# Patient Record
Sex: MALE | Race: WHITE | NOT HISPANIC OR LATINO | Employment: FULL TIME | RURAL
[De-identification: names, ages, dates, MRNs, and addresses within clinical notes are randomized per-mention and may not be internally consistent; named-entity substitution may affect disease eponyms.]

---

## 2017-01-06 RX ORDER — OMEPRAZOLE 40 MG/1
CAPSULE, DELAYED RELEASE ORAL
Qty: 90 CAPSULE | Refills: 1 | Status: SHIPPED | OUTPATIENT
Start: 2017-01-06 | End: 2017-07-03 | Stop reason: SDUPTHER

## 2017-01-30 ENCOUNTER — OFFICE VISIT (OUTPATIENT)
Dept: FAMILY MEDICINE CLINIC | Facility: CLINIC | Age: 42
End: 2017-01-30

## 2017-01-30 VITALS
RESPIRATION RATE: 16 BRPM | SYSTOLIC BLOOD PRESSURE: 118 MMHG | HEART RATE: 70 BPM | WEIGHT: 260 LBS | DIASTOLIC BLOOD PRESSURE: 80 MMHG | OXYGEN SATURATION: 98 %

## 2017-01-30 DIAGNOSIS — R09.89 PULSE IRREGULARITY: Primary | ICD-10-CM

## 2017-01-30 DIAGNOSIS — R05.9 COUGH: ICD-10-CM

## 2017-01-30 PROCEDURE — 99213 OFFICE O/P EST LOW 20 MIN: CPT | Performed by: FAMILY MEDICINE

## 2017-01-30 NOTE — MR AVS SNAPSHOT
Omid SMITH [place]   1/30/2017 1:00 PM   Office Visit    Dept Phone:  [phone]   Encounter #:  [id number]    Provider:  Sixto Leung MD   Department:  [facility] FAMILY [facility]IC[facility]                Your Full Care Plan              Today's Medication Changes          These changes are accurate as of: 1/30/17  1:15 PM.  If you have any questions, ask your nurse or doctor.               New Medication(s)Ordered:     beclomethasone 40 MCG/ACT inhaler   Commonly known as:  QVAR   Inhale 2 puffs 2 (Two) Times a Day.   Started by:  Sixto Leung MD            Where to Get Your Medications      These medications were sent to [facility] - [place], [place] - [address] [place] [address] - [phone]  - [phone]   [address] [place] [address]     Phone:  [phone]     beclomethasone 40 MCG/ACT inhaler                  Your Updated Medication List          This list is accurate as of: 1/30/17  1:15 PM.  Always use your most recent med list.                beclomethasone 40 MCG/ACT inhaler   Commonly known as:  QVAR   Inhale 2 puffs 2 (Two) Times a Day.       omeprazole 40 MG capsule   Commonly known as:  priLOSEC   TAKE 1 CAPSULE EVERY MORNING ON EMPTY STOMACH GENERIC FOR PRILOSEC               We Performed the Following     Holter Monitor - 48 Hour     XR Chest 2 View       You Were Diagnosed With        Codes Comments    Pulse irregularity    -  Primary ICD-10-CM: R09.89  ICD-9-CM: 785.9     Cough     ICD-10-CM: R05  ICD-9-CM: 786.2       Instructions     None    Patient Instructions History      Upcoming Appointments     Visit Type Date Time Department    OFFICE VISIT 1/30/2017  1:00 PM [facility] [place]    FOLLOW UP 3/23/2017 11:00 AM [facility] VASCULAR [facility]G [facility]      MyChart Signup     Our records indicate that you have declined [facility] MyChart signup. If you would like to sign up for MyChart, please email [email or web address] or call [phone] to  obtain an activation code.             Other Info from Your Visit           Your Appointments     Mar 23, 2017 11:00 AM CDT   Follow Up with Lion Short DO   [facility] VASCULAR SERVICES (--)    [address] [place] [address] [place] [address] [place] [address] [id number] [phone]           Arrive 15 minutes prior to appointment.              Allergies     No Known Allergies      Reason for Visit     Cough x 1 week pressure in upper chest, dry cough, felt like his heart was out of rhythm last night      Vital Signs     Blood Pressure Pulse Respirations Weight Oxygen Saturation Smoking Status    118/80 70 16 260 lb (118 kg) 98% Never Smoker      Problems and Diagnoses Noted     Cough    Pulse irregularity

## 2017-01-30 NOTE — PROGRESS NOTES
Subjective   Omid Jarquin is a 41 y.o. male.     Chief Complaint   Patient presents with   • Cough     x 1 week pressure in upper chest, dry cough, felt like his heart was out of rhythm last night       History of Present Illness     two issues--for the past week has had a dry cough--his gerd symptoms have been stable--also had irregualr pulse last night      Current Outpatient Prescriptions:   •  omeprazole (priLOSEC) 40 MG capsule, TAKE 1 CAPSULE EVERY MORNING ON EMPTY STOMACH GENERIC FOR PRILOSEC, Disp: 90 capsule, Rfl: 1  No Known Allergies    No past medical history on file.  No past surgical history on file.    Review of Systems   Constitutional: Negative.    HENT: Negative.    Eyes: Negative.    Respiratory: Positive for cough.    Cardiovascular: Negative.    Gastrointestinal: Negative.    Endocrine: Negative.    Genitourinary: Negative.    Musculoskeletal: Negative.    Skin: Negative.    Allergic/Immunologic: Negative.    Neurological: Negative.    Hematological: Negative.    Psychiatric/Behavioral: Negative.        Objective    Visit Vitals   • /80   • Pulse 70   • Resp 16   • Wt 260 lb (118 kg)   • SpO2 98%     Physical Exam   Constitutional: He is oriented to person, place, and time. He appears well-developed and well-nourished.   HENT:   Head: Normocephalic and atraumatic.   Right Ear: External ear normal.   Left Ear: External ear normal.   Nose: Nose normal.   Mouth/Throat: Oropharynx is clear and moist.   Eyes: Conjunctivae and EOM are normal. Pupils are equal, round, and reactive to light.   Neck: Normal range of motion. Neck supple.   Cardiovascular: Normal rate, regular rhythm, normal heart sounds and intact distal pulses.    Pulmonary/Chest: Effort normal and breath sounds normal.   Abdominal: Soft. Bowel sounds are normal.   Musculoskeletal: Normal range of motion.   Neurological: He is alert and oriented to person, place, and time. He has normal reflexes.   Skin: Skin is warm and dry.    Psychiatric: He has a normal mood and affect. His behavior is normal. Judgment and thought content normal.   Nursing note and vitals reviewed.      Assessment/Plan   Omid was seen today for cough.    Diagnoses and all orders for this visit:    Pulse irregularity  -     Holter Monitor - 48 Hour    Cough  -     XR Chest 2 View    Other orders  -     beclomethasone (QVAR) 40 MCG/ACT inhaler; Inhale 2 puffs 2 (Two) Times a Day.                 No orders of the defined types were placed in this encounter.      Follow up: 6 week(s)

## 2017-02-03 ENCOUNTER — HOSPITAL ENCOUNTER (OUTPATIENT)
Dept: CARDIOLOGY | Facility: HOSPITAL | Age: 42
Discharge: HOME | End: 2017-02-03
Attending: FAMILY MEDICINE | Admitting: FAMILY MEDICINE

## 2017-02-03 PROCEDURE — 93226 XTRNL ECG REC<48 HR SCAN A/R: CPT

## 2017-02-03 PROCEDURE — 93225 XTRNL ECG REC<48 HRS REC: CPT

## 2017-03-24 ENCOUNTER — OFFICE VISIT (OUTPATIENT)
Dept: VASCULAR SURGERY | Facility: CLINIC | Age: 42
End: 2017-03-24

## 2017-03-24 VITALS
HEIGHT: 76 IN | BODY MASS INDEX: 32.39 KG/M2 | WEIGHT: 266 LBS | DIASTOLIC BLOOD PRESSURE: 80 MMHG | HEART RATE: 73 BPM | SYSTOLIC BLOOD PRESSURE: 122 MMHG

## 2017-03-24 DIAGNOSIS — I87.2 VENOUS INSUFFICIENCY: Primary | ICD-10-CM

## 2017-03-24 PROCEDURE — 99213 OFFICE O/P EST LOW 20 MIN: CPT | Performed by: NURSE PRACTITIONER

## 2017-03-24 RX ORDER — CETIRIZINE HYDROCHLORIDE 10 MG/1
10 TABLET ORAL DAILY
COMMUNITY

## 2017-03-27 PROBLEM — I87.2 VENOUS INSUFFICIENCY: Status: ACTIVE | Noted: 2017-03-27

## 2017-03-27 NOTE — PROGRESS NOTES
"03/24/2017      Sixto Leung MD  [address] [place] [address]        Omid Jarquin  1975    Chief Complaint   Patient presents with   • Follow-up     1 yr f/u no testing/Pt states half of his L heel and foot are still numb       Dear Sixto Leung MD:    HPI     I had the pleasure of seeing you patient in the office today for follow up.  As you recall, the patient is a 41 y.o. male who had a long-standing history of left lower extremity swelling and venous insufficiency with varicose veins.  He underwent a lesser saphenous vein radiofrequency ablation in December 2015 and is now back for continued follow-up.  He does continue to do well and most of his symptomatology has resolved.  He does complain of some numbness of the heel since the procedure that is unchanged from his last visit.    /80  Pulse 73  Ht 76\" (193 cm)  Wt 266 lb (121 kg)  BMI 32.38 kg/m2     Physical Exam   Constitutional: He is oriented to person, place, and time. He appears well-developed and well-nourished. No distress.   HENT:   Head: Normocephalic and atraumatic.   Mouth/Throat: Oropharynx is clear and moist and mucous membranes are normal.   Eyes: Pupils are equal, round, and reactive to light. No scleral icterus.   Neck: No JVD present. Carotid bruit is not present.   Cardiovascular: Normal rate, regular rhythm, S1 normal, S2 normal, normal heart sounds, intact distal pulses and normal pulses.  Exam reveals no gallop and no friction rub.    No murmur heard.  Pulses:       Femoral pulses are 2+ on the right side, and 2+ on the left side.       Popliteal pulses are 2+ on the right side, and 2+ on the left side.        Dorsalis pedis pulses are 2+ on the right side, and 2+ on the left side.        Posterior tibial pulses are 2+ on the right side, and 2+ on the left side.   Pulmonary/Chest: Effort normal and breath sounds normal.   Abdominal: Soft. Normal appearance, normal aorta and bowel sounds are normal. " He exhibits no abdominal bruit. There is no hepatosplenomegaly. There is no tenderness.   Musculoskeletal: Normal range of motion.       Vascular Status -  His exam exhibits no right foot edema. His exam exhibits no left foot edema.  Neurological: He is alert and oriented to person, place, and time. He has normal strength. No cranial nerve deficit.   Skin: Skin is warm, dry and intact. He is not diaphoretic.   Psychiatric: He has a normal mood and affect. His behavior is normal. Judgment and thought content normal. Cognition and memory are normal.   Nursing note and vitals reviewed.      Patient Active Problem List   Diagnosis   • Pulse irregularity   • Cough   • Venous insufficiency         ICD-10-CM ICD-9-CM   1. Venous insufficiency I87.2 459.81       PLAN: After thoroughly evaluating Omid Jarquin, I believe the best course of action is to remain conservative from vascular standpoint.  We will see him back in 1 year for a quick check on his legs.  He does still have some numbness to his left heel which at this point is permanent.  We will continue his other symptoms have resolved as far as his venous insufficiency is concerned.  The patient is to continue taking their medications as previously discussed.   This was all discussed in full with complete understanding.  Thank you for allowing me to participate in the care of your patient.  Please do not hesitate to call with any questions or concerns.  We will keep you aware of any further encounters with Omid Jarquin.      Sincerely Yours,        ZACARIAS Khan

## 2017-06-27 ENCOUNTER — TELEPHONE (OUTPATIENT)
Dept: FAMILY MEDICINE CLINIC | Facility: CLINIC | Age: 42
End: 2017-06-27

## 2017-06-27 ENCOUNTER — OFFICE VISIT (OUTPATIENT)
Dept: FAMILY MEDICINE CLINIC | Facility: CLINIC | Age: 42
End: 2017-06-27

## 2017-06-27 VITALS
HEIGHT: 76 IN | DIASTOLIC BLOOD PRESSURE: 76 MMHG | WEIGHT: 263 LBS | RESPIRATION RATE: 16 BRPM | OXYGEN SATURATION: 98 % | HEART RATE: 74 BPM | SYSTOLIC BLOOD PRESSURE: 122 MMHG | BODY MASS INDEX: 32.03 KG/M2

## 2017-06-27 DIAGNOSIS — R10.11 RIGHT UPPER QUADRANT ABDOMINAL PAIN: Primary | ICD-10-CM

## 2017-06-27 PROCEDURE — 99213 OFFICE O/P EST LOW 20 MIN: CPT | Performed by: FAMILY MEDICINE

## 2017-06-27 RX ORDER — CHLORDIAZEPOXIDE HYDROCHLORIDE AND CLIDINIUM BROMIDE 5; 2.5 MG/1; MG/1
1 CAPSULE ORAL
Qty: 21 CAPSULE | Refills: 0 | Status: SHIPPED | OUTPATIENT
Start: 2017-06-27 | End: 2017-11-22

## 2017-06-27 RX ORDER — OMEPRAZOLE 40 MG/1
CAPSULE, DELAYED RELEASE ORAL
Qty: 90 CAPSULE | OUTPATIENT
Start: 2017-06-27

## 2017-06-27 NOTE — TELEPHONE ENCOUNTER
Pt called he asked for an appt today she said that he is having pain in the right lower rib cage woke up with bad reflux nauea and broke out in a sweat he thinks its his gb? [phone]

## 2017-06-27 NOTE — PROGRESS NOTES
"Subjective   Omid Jarquin is a 42 y.o. male.     Chief Complaint   Patient presents with   • Chest Pain     pain under right side rib also reflux/heartburn, also c/o sweats this AM     History of Present Illness     noted having ruq pain for 3 days--denies fever or chills      Current Outpatient Prescriptions:   •  cetirizine (zyrTEC) 10 MG tablet, Take 10 mg by mouth Daily., Disp: , Rfl:   •  omeprazole (priLOSEC) 40 MG capsule, TAKE 1 CAPSULE EVERY MORNING ON EMPTY STOMACH GENERIC FOR PRILOSEC, Disp: 90 capsule, Rfl: 1  No Known Allergies    Past Medical History:   Diagnosis Date   • Allergic rhinitis    • GERD (gastroesophageal reflux disease)      Past Surgical History:   Procedure Laterality Date   • HERNIA REPAIR      umbilical   • SINUS SURGERY     • TONSILLECTOMY         Review of Systems   Constitutional: Negative.    HENT: Negative.    Eyes: Negative.    Respiratory: Negative.    Cardiovascular: Negative.    Gastrointestinal: Positive for abdominal pain.   Endocrine: Negative.    Genitourinary: Negative.    Musculoskeletal: Negative.    Skin: Negative.    Allergic/Immunologic: Negative.    Neurological: Negative.    Hematological: Negative.    Psychiatric/Behavioral: Negative.        Objective  /76  Pulse 74  Resp 16  Ht 76\" (193 cm)  Wt 263 lb (119 kg)  SpO2 98%  BMI 32.01 kg/m2  Physical Exam   Constitutional: He is oriented to person, place, and time. He appears well-developed and well-nourished.   HENT:   Head: Normocephalic and atraumatic.   Right Ear: External ear normal.   Left Ear: External ear normal.   Nose: Nose normal.   Mouth/Throat: Oropharynx is clear and moist.   Eyes: Conjunctivae are normal. Pupils are equal, round, and reactive to light.   Neck: Normal range of motion. Neck supple.   Cardiovascular: Normal rate, regular rhythm, normal heart sounds and intact distal pulses.    Pulmonary/Chest: Effort normal and breath sounds normal.   Abdominal: Bowel sounds are normal. He " exhibits no distension and no mass. There is tenderness. There is no rebound and no guarding. No hernia.   Musculoskeletal: Normal range of motion.   Neurological: He is alert and oriented to person, place, and time. He has normal reflexes.   Skin: Skin is warm and dry.   Psychiatric: He has a normal mood and affect. His behavior is normal. Judgment and thought content normal.   Nursing note and vitals reviewed.      Assessment/Plan   Omid was seen today for chest pain.    Diagnoses and all orders for this visit:    Right upper quadrant abdominal pain  -     US Gallbladder  -     NM HIDA scan with pharmacological intervention                  Orders Placed This Encounter   Procedures   • US Gallbladder     Order Specific Question:   Reason for Exam:     Answer:   ruq pain   • NM HIDA scan with pharmacological intervention     Order Specific Question:   Reason for Exam:     Answer:   ruq pain       Follow up:prn

## 2017-06-30 ENCOUNTER — TELEPHONE (OUTPATIENT)
Dept: FAMILY MEDICINE CLINIC | Facility: CLINIC | Age: 42
End: 2017-06-30

## 2017-07-03 RX ORDER — OMEPRAZOLE 40 MG/1
40 CAPSULE, DELAYED RELEASE ORAL DAILY
Qty: 90 CAPSULE | Refills: 1 | Status: SHIPPED | OUTPATIENT
Start: 2017-07-03 | End: 2017-12-29 | Stop reason: SDUPTHER

## 2017-11-22 ENCOUNTER — OFFICE VISIT (OUTPATIENT)
Dept: FAMILY MEDICINE CLINIC | Facility: CLINIC | Age: 42
End: 2017-11-22

## 2017-11-22 VITALS
DIASTOLIC BLOOD PRESSURE: 86 MMHG | OXYGEN SATURATION: 99 % | HEART RATE: 74 BPM | HEIGHT: 76 IN | WEIGHT: 270 LBS | BODY MASS INDEX: 32.88 KG/M2 | SYSTOLIC BLOOD PRESSURE: 122 MMHG | RESPIRATION RATE: 16 BRPM

## 2017-11-22 DIAGNOSIS — Z00.00 WELLNESS EXAMINATION: Primary | ICD-10-CM

## 2017-11-22 PROCEDURE — 99396 PREV VISIT EST AGE 40-64: CPT | Performed by: FAMILY MEDICINE

## 2017-11-22 NOTE — PROGRESS NOTES
"Subjective   Omid Jarquin is a 42 y.o. male.     Chief Complaint   Patient presents with   • Annual Exam     wellness        History of Present Illness     he is here today for wellness exam--no new complaints      Current Outpatient Prescriptions:   •  cetirizine (zyrTEC) 10 MG tablet, Take 10 mg by mouth Daily., Disp: , Rfl:   •  omeprazole (priLOSEC) 40 MG capsule, Take 1 capsule by mouth Daily., Disp: 90 capsule, Rfl: 1  No Known Allergies    Past Medical History:   Diagnosis Date   • Allergic rhinitis    • GERD (gastroesophageal reflux disease)      Past Surgical History:   Procedure Laterality Date   • HERNIA REPAIR      umbilical   • SINUS SURGERY     • TONSILLECTOMY         Review of Systems   Constitutional: Negative.    HENT: Negative.    Eyes: Negative.    Respiratory: Negative.    Cardiovascular: Negative.    Gastrointestinal: Negative.    Endocrine: Negative.    Genitourinary: Negative.    Musculoskeletal: Negative.    Skin: Negative.    Allergic/Immunologic: Negative.    Neurological: Negative.    Hematological: Negative.    Psychiatric/Behavioral: Negative.        Objective  /86  Pulse 74  Resp 16  Ht 76\" (193 cm)  Wt 270 lb (122 kg)  SpO2 99%  BMI 32.87 kg/m2  Physical Exam   Constitutional: He is oriented to person, place, and time. He appears well-developed and well-nourished.   HENT:   Head: Normocephalic.   Eyes: Conjunctivae and EOM are normal. Pupils are equal, round, and reactive to light.   Neck: Normal range of motion. Neck supple.   Cardiovascular: Normal rate, regular rhythm, normal heart sounds and intact distal pulses.    Pulmonary/Chest: Effort normal and breath sounds normal.   Abdominal: Soft. Bowel sounds are normal.   Musculoskeletal: Normal range of motion.   Neurological: He is alert and oriented to person, place, and time. He has normal reflexes.   Skin: Skin is warm and dry.   Psychiatric: He has a normal mood and affect. His behavior is normal. Judgment and thought " content normal.   Nursing note and vitals reviewed.      Assessment/Plan   Omid was seen today for annual exam.    Diagnoses and all orders for this visit:    Wellness examination  -     CBC w AUTO Differential  -     Comprehensive Metabolic Panel  -     Lipid Panel  -     PSA  -     Urinalysis - Urine, Clean Catch        I advised weight loss         Orders Placed This Encounter   Procedures   • Comprehensive Metabolic Panel   • Lipid Panel   • PSA   • Urinalysis - Urine, Clean Catch   • CBC w AUTO Differential     Order Specific Question:   Manual Differential     Answer:   No       Follow up: 12 month(s)

## 2017-11-27 LAB
ALBUMIN SERPL-MCNC: 4.3 G/DL (ref 3.5–5.5)
ALBUMIN/GLOB SERPL: 1.7 {RATIO} (ref 1.2–2.2)
ALP SERPL-CCNC: 69 IU/L (ref 39–117)
ALT SERPL-CCNC: 49 IU/L (ref 0–44)
APPEARANCE UR: CLEAR
AST SERPL-CCNC: 29 IU/L (ref 0–40)
BASOPHILS # BLD AUTO: 0.1 X10E3/UL (ref 0–0.2)
BASOPHILS NFR BLD AUTO: 1 %
BILIRUB SERPL-MCNC: 0.5 MG/DL (ref 0–1.2)
BILIRUB UR QL STRIP: NEGATIVE
BUN SERPL-MCNC: 14 MG/DL (ref 6–24)
BUN/CREAT SERPL: 15 (ref 9–20)
CALCIUM SERPL-MCNC: 9.2 MG/DL (ref 8.7–10.2)
CHLORIDE SERPL-SCNC: 103 MMOL/L (ref 96–106)
CHOLEST SERPL-MCNC: 184 MG/DL (ref 100–199)
CO2 SERPL-SCNC: 22 MMOL/L (ref 18–29)
COLOR UR: YELLOW
CREAT SERPL-MCNC: 0.91 MG/DL (ref 0.76–1.27)
EOSINOPHIL # BLD AUTO: 0.1 X10E3/UL (ref 0–0.4)
EOSINOPHIL NFR BLD AUTO: 3 %
ERYTHROCYTE [DISTWIDTH] IN BLOOD BY AUTOMATED COUNT: 14.7 % (ref 12.3–15.4)
GFR SERPLBLD CREATININE-BSD FMLA CKD-EPI: 102 ML/MIN/1.73
GFR SERPLBLD CREATININE-BSD FMLA CKD-EPI: 118 ML/MIN/1.73
GLOBULIN SER CALC-MCNC: 2.6 G/DL (ref 1.5–4.5)
GLUCOSE SERPL-MCNC: 91 MG/DL (ref 65–99)
GLUCOSE UR QL: NEGATIVE
HAV IGM SERPL QL IA: NEGATIVE
HBV CORE IGM SERPL QL IA: NEGATIVE
HBV SURFACE AG SERPL QL IA: NEGATIVE
HCT VFR BLD AUTO: 41 % (ref 37.5–51)
HCV AB S/CO SERPL IA: <0.1 S/CO RATIO (ref 0–0.9)
HCV RNA SERPL NAA+PROBE-ACNC: NORMAL IU/ML
HDLC SERPL-MCNC: 42 MG/DL
HGB BLD-MCNC: 13.9 G/DL (ref 12.6–17.7)
HGB UR QL STRIP: NEGATIVE
HIV 2 AB SERPL QL IA: NEGATIVE
HSV1 IGG SER IA-ACNC: <0.91 INDEX (ref 0–0.9)
HSV1 IGM TITR SER IF: NORMAL TITER
HSV2 IGG SER IA-ACNC: <0.91 INDEX (ref 0–0.9)
HSV2 IGM TITR SER IF: NORMAL TITER
IMM GRANULOCYTES # BLD: 0 X10E3/UL (ref 0–0.1)
IMM GRANULOCYTES NFR BLD: 0 %
KETONES UR QL STRIP: NEGATIVE
LDLC SERPL CALC-MCNC: 107 MG/DL (ref 0–99)
LEUKOCYTE ESTERASE UR QL STRIP: NEGATIVE
LYMPHOCYTES # BLD AUTO: 1.4 X10E3/UL (ref 0.7–3.1)
LYMPHOCYTES NFR BLD AUTO: 29 %
MCH RBC QN AUTO: 27.6 PG (ref 26.6–33)
MCHC RBC AUTO-ENTMCNC: 33.9 G/DL (ref 31.5–35.7)
MCV RBC AUTO: 82 FL (ref 79–97)
MONOCYTES # BLD AUTO: 0.4 X10E3/UL (ref 0.1–0.9)
MONOCYTES NFR BLD AUTO: 9 %
NEUTROPHILS # BLD AUTO: 2.7 X10E3/UL (ref 1.4–7)
NEUTROPHILS NFR BLD AUTO: 58 %
NITRITE UR QL STRIP: NEGATIVE
PH UR STRIP: 6 [PH] (ref 5–7.5)
PLATELET # BLD AUTO: 227 X10E3/UL (ref 150–379)
POTASSIUM SERPL-SCNC: 4.9 MMOL/L (ref 3.5–5.2)
PROT SERPL-MCNC: 6.9 G/DL (ref 6–8.5)
PROT UR QL STRIP: NEGATIVE
PSA SERPL-MCNC: 0.5 NG/ML (ref 0–4)
RBC # BLD AUTO: 5.03 X10E6/UL (ref 4.14–5.8)
RPR SER QL: NON REACTIVE
SODIUM SERPL-SCNC: 143 MMOL/L (ref 134–144)
SP GR UR: 1.03 (ref 1–1.03)
TEST INFORMATION: NORMAL
TRIGL SERPL-MCNC: 175 MG/DL (ref 0–149)
UROBILINOGEN UR STRIP-MCNC: 0.2 MG/DL (ref 0.2–1)
VLDLC SERPL CALC-MCNC: 35 MG/DL (ref 5–40)
WBC # BLD AUTO: 4.7 X10E3/UL (ref 3.4–10.8)

## 2017-12-29 RX ORDER — OMEPRAZOLE 40 MG/1
CAPSULE, DELAYED RELEASE ORAL
Qty: 90 CAPSULE | Refills: 0 | Status: SHIPPED | OUTPATIENT
Start: 2017-12-29 | End: 2018-03-30 | Stop reason: SDUPTHER

## 2018-01-02 ENCOUNTER — TELEPHONE (OUTPATIENT)
Dept: FAMILY MEDICINE CLINIC | Facility: CLINIC | Age: 43
End: 2018-01-02

## 2018-01-02 RX ORDER — CLARITHROMYCIN 500 MG/1
500 TABLET, COATED ORAL EVERY 12 HOURS SCHEDULED
Qty: 20 TABLET | Refills: 0 | Status: SHIPPED | OUTPATIENT
Start: 2018-01-02 | End: 2018-01-12

## 2018-01-02 NOTE — TELEPHONE ENCOUNTER
Pt called he siad that he has a bad sinus inf with blowing out colored mucus with blood he wants to know if u will bryan in [place] md2 [phone]

## 2018-02-05 ENCOUNTER — TELEPHONE (OUTPATIENT)
Dept: FAMILY MEDICINE CLINIC | Facility: CLINIC | Age: 43
End: 2018-02-05

## 2018-02-05 RX ORDER — AMOXICILLIN AND CLAVULANATE POTASSIUM 875; 125 MG/1; MG/1
1 TABLET, FILM COATED ORAL 2 TIMES DAILY
Qty: 14 TABLET | Refills: 0 | Status: SHIPPED | OUTPATIENT
Start: 2018-02-05 | End: 2018-02-12

## 2018-02-05 NOTE — TELEPHONE ENCOUNTER
Pt called he has sore throat with white patches dry cough romulo u call in meds or see him md2 [phone]

## 2018-02-23 ENCOUNTER — HOSPITAL ENCOUNTER (OUTPATIENT)
Dept: GENERAL RADIOLOGY | Facility: HOSPITAL | Age: 43
Discharge: HOME | End: 2018-02-23
Admitting: NURSE PRACTITIONER

## 2018-02-23 ENCOUNTER — OFFICE VISIT (OUTPATIENT)
Dept: OTOLARYNGOLOGY | Facility: CLINIC | Age: 43
End: 2018-02-23

## 2018-02-23 VITALS
TEMPERATURE: 97.8 F | HEIGHT: 76 IN | SYSTOLIC BLOOD PRESSURE: 144 MMHG | BODY MASS INDEX: 31.66 KG/M2 | WEIGHT: 260 LBS | DIASTOLIC BLOOD PRESSURE: 89 MMHG | HEART RATE: 74 BPM

## 2018-02-23 DIAGNOSIS — R05.9 COUGH: Primary | ICD-10-CM

## 2018-02-23 DIAGNOSIS — J18.9 PNEUMONIA DUE TO INFECTIOUS ORGANISM, UNSPECIFIED LATERALITY, UNSPECIFIED PART OF LUNG: ICD-10-CM

## 2018-02-23 DIAGNOSIS — R05.9 COUGH: ICD-10-CM

## 2018-02-23 DIAGNOSIS — J30.9 ALLERGIC RHINITIS, UNSPECIFIED CHRONICITY, UNSPECIFIED SEASONALITY, UNSPECIFIED TRIGGER: ICD-10-CM

## 2018-02-23 DIAGNOSIS — J40 BRONCHITIS: ICD-10-CM

## 2018-02-23 PROCEDURE — 71046 X-RAY EXAM CHEST 2 VIEWS: CPT

## 2018-02-23 PROCEDURE — 99213 OFFICE O/P EST LOW 20 MIN: CPT | Performed by: NURSE PRACTITIONER

## 2018-02-23 RX ORDER — ALBUTEROL SULFATE 90 UG/1
1-2 AEROSOL, METERED RESPIRATORY (INHALATION) EVERY 6 HOURS PRN
Qty: 3.7 G | Refills: 3 | Status: SHIPPED | OUTPATIENT
Start: 2018-02-23 | End: 2018-05-11

## 2018-02-23 RX ORDER — CEFUROXIME AXETIL 250 MG/1
250 TABLET ORAL 2 TIMES DAILY
Qty: 20 TABLET | Refills: 0 | Status: SHIPPED | OUTPATIENT
Start: 2018-02-23 | End: 2018-05-11

## 2018-02-23 RX ORDER — METHYLPREDNISOLONE 4 MG/1
TABLET ORAL
Qty: 1 EACH | Refills: 0 | Status: SHIPPED | OUTPATIENT
Start: 2018-02-23 | End: 2018-03-01

## 2018-02-23 NOTE — PATIENT INSTRUCTIONS
Call for problems or worsening symptoms    Avoidance of allergies discussed.  Use masks when performing yardwork or cleaning activities if indicated.  Continue allergy medications as discussed.    Air purifier as needed.  If on nasal sprays, recommend to use daily as indicated.   Medical vs surgical options discussed.

## 2018-02-24 NOTE — PROGRESS NOTES
YOB: 1975  Location: [facility]  Location Address: [address] [place] [address], [facility], [address] [place] [address]  Location Phone: [phone]    Chief Complaint   Patient presents with   • Sinus Problem       History of Present Illness  Omid Jarquin is a 42 y.o. male.  Omid Jarquin is here for follow up of ENT complaints. The patient has had problems with thorat drainage, cough  The symptoms are not localized to a particular location. The patient has had severe symptoms. The symptoms have been present for the last several weeks The symptoms are aggravated by  allergy and weather change. The symptoms are improved by no identifiable factors.  He has been on a zpack and amoxicillin without improvement.  He continues to cough especially at night.  He denies SOB, sputum, chest pain.  He has been fishing in [place] this last week or so.  He was sick and coughing before he left.     Past Medical History:   Diagnosis Date   • Allergic rhinitis    • Chronic rhinitis    • Deviated nasal septum    • Eustachian tube dysfunction    • GERD (gastroesophageal reflux disease)    • Hypertrophy of nasal turbinates    • LPRD (laryngopharyngeal reflux disease)    • Sensorineural hearing loss    • Shift work sleep disorder    • Tinnitus        Past Surgical History:   Procedure Laterality Date   • HERNIA REPAIR      umbilical   • SEPTOPLASTY     • SINUS SURGERY      resser   • TONSILLECTOMY  30yrs    clay       Outpatient Prescriptions Marked as Taking for the 18 encounter (Office Visit) with ZACARIAS Lambert   Medication Sig Dispense Refill   • cetirizine (zyrTEC) 10 MG tablet Take 10 mg by mouth Daily.     • Dextromethorphan Polistirex (COUGH DM PO) Take  by mouth.     • omeprazole (priLOSEC) 40 MG capsule TAKE 1 CAPSULE EVERY MORNING ON EMPTY STOMACH GENERIC FOR PRILOSEC 90 capsule 0       Augmentin [amoxicillin-pot clavulanate]; Avelox [moxifloxacin]; and Levaquin [levofloxacin in d5w]    Family History    Problem Relation Age of Onset   • No Known Problems Mother    • Cancer Father      skin       Social History     Social History   • Marital status:      Spouse name: N/A   • Number of children: N/A   • Years of education: N/A     Occupational History   • Not on file.     Social History Main Topics   • Smoking status: Never Smoker   • Smokeless tobacco: Never Used   • Alcohol use Yes      Comment: occasional   • Drug use: No   • Sexual activity: Defer     Other Topics Concern   • Not on file     Social History Narrative       Review of Systems   Constitutional: Negative.    HENT:        SEE HPI   Eyes: Negative.    Respiratory: Negative.    Cardiovascular: Negative.    Gastrointestinal: Negative.    Endocrine: Negative.    Genitourinary: Negative.    Musculoskeletal: Negative.    Skin: Negative.    Allergic/Immunologic: Negative.    Neurological: Negative.    Hematological: Negative.    Psychiatric/Behavioral: Negative.        Vitals:    02/23/18 0902   BP: 144/89   Pulse: 74   Temp: 97.8 °F (36.6 °C)       Body mass index is 31.65 kg/(m^2).    Objective     Physical Exam  CONSTITUTIONAL: well nourished, alert, oriented, in no acute distress     COMMUNICATION AND VOICE: able to communicate normally, normal voice quality    HEAD: normocephalic, no lesions, atraumatic, no tenderness, no masses     FACE: appearance normal, no lesions, no tenderness, no deformities, facial motion symmetric    SALIVARY GLANDS: parotid glands with no tenderness, no swelling, no masses, submandibular glands with normal size, nontender    EYES: ocular motility normal, eyelids normal, orbits normal, no proptosis, conjunctiva normal , pupils equal, round     EARS:  Hearing: response to conversational voice normal bilaterally   External Ears: auricles without lesions  Otoscopic: tympanic membrane appearance normal, no lesions, no perforation, normal mobility, no fluid    NOSE:  External Nose: structure normal, no tenderness on  palpation, no nasal discharge, no lesions, no evidence of trauma, nostrils patent   Intranasal Exam: nasal mucosa normal, vestibule within normal limits, inferior turbinate normal, nasal septum midline     ORAL:  Lips: upper and lower lips without lesion   Teeth: dentition within normal limits for age   Gums: gingivae healthy   Oral Mucosa: oral mucosa normal, no mucosal lesions   Floor of Mouth: Warthin’s duct patent, mucosa normal  Tongue: lingual mucosa normal without lesions, normal tongue mobility   Palate: soft and hard palates with normal mucosa and structure  Oropharynx: oropharyngeal mucosa normal    NECK: neck appearance normal, no mass,  noted without erythema or tenderness    THYROID: no overt thyromegaly, no tenderness, nodules or mass present on palpation, position midline     LYMPH NODES: no lymphadenopathy    CHEST/RESPIRATORY: respiratory effort normal, normal breath sounds     CARDIOVASCULAR: rate and rhythm normal, extremities without cyanosis or edema      NEUROLOGIC/PSYCHIATRIC: oriented to time, place and person, mood normal, affect appropriate, CN II-XII intact grossly    Assessment/Plan   Omid was seen today for sinus problem.    Diagnoses and all orders for this visit:    Cough  -     XR chest pa and lateral; Future    Pneumonia due to infectious organism, unspecified laterality, unspecified part of lung r/o  -     XR chest pa and lateral; Future    Bronchitis    Allergic rhinitis, unspecified chronicity, unspecified seasonality, unspecified trigger    Other orders  -     MethylPREDNISolone (MEDROL) 4 MG tablet; Take as directed on package instructions.  -     albuterol (PROVENTIL HFA;VENTOLIN HFA) 108 (90 Base) MCG/ACT inhaler; Inhale 1-2 puffs Every 6 (Six) Hours As Needed for Wheezing.  -     cefuroxime (CEFTIN) 250 MG tablet; Take 1 tablet by mouth 2 (Two) Times a Day.      * Surgery not found *  Orders Placed This Encounter   Procedures   • XR chest pa and lateral     Standing Status:    Future     Standing Expiration Date:   2/23/2019     Order Specific Question:   Reason for Exam:     Answer:   pneumonia, cough     Return in about 6 months (around 8/23/2018).       Patient Instructions   Call for problems or worsening symptoms    Avoidance of allergies discussed.  Use masks when performing yardwork or cleaning activities if indicated.  Continue allergy medications as discussed.    Air purifier as needed.  If on nasal sprays, recommend to use daily as indicated.   Medical vs surgical options discussed.         no

## 2018-03-30 RX ORDER — OMEPRAZOLE 40 MG/1
CAPSULE, DELAYED RELEASE ORAL
Qty: 90 CAPSULE | Refills: 1 | Status: SHIPPED | OUTPATIENT
Start: 2018-03-30 | End: 2018-09-27 | Stop reason: SDUPTHER

## 2018-05-02 ENCOUNTER — TELEPHONE (OUTPATIENT)
Dept: VASCULAR SURGERY | Facility: CLINIC | Age: 43
End: 2018-05-02

## 2018-05-02 NOTE — TELEPHONE ENCOUNTER
Left message for patient reminding him of his upcoming appointment with Dr. Short.  Requested that patient call me back to RS if needed. Left all contact information for a return call if needed.

## 2018-05-03 ENCOUNTER — OFFICE VISIT (OUTPATIENT)
Dept: VASCULAR SURGERY | Facility: CLINIC | Age: 43
End: 2018-05-03

## 2018-05-03 VITALS
SYSTOLIC BLOOD PRESSURE: 138 MMHG | HEART RATE: 68 BPM | OXYGEN SATURATION: 98 % | BODY MASS INDEX: 32.51 KG/M2 | DIASTOLIC BLOOD PRESSURE: 88 MMHG | HEIGHT: 76 IN | WEIGHT: 267 LBS

## 2018-05-03 DIAGNOSIS — I87.2 VENOUS INSUFFICIENCY: Primary | ICD-10-CM

## 2018-05-03 PROCEDURE — 99213 OFFICE O/P EST LOW 20 MIN: CPT | Performed by: NURSE PRACTITIONER

## 2018-05-03 NOTE — PROGRESS NOTES
"05/03/2018       Sixto Leung MD  [address] [place] [id number]        Omid Jarquin  1975    Chief Complaint   Patient presents with   • Follow-up     1 year follow up without testing. PT c/o numbness in left heel and foot, discolored spot on back of left leg - knotted up. Denies pain and stroke like symptoms.        Dear Sixto Leung MD:    HPI     I had the pleasure of seeing you patient in the office today for follow up.  As you recall, the patient is a 42 y.o. male who had a long-standing history of left lower extremity swelling and venous insufficiency with varicose veins.  He underwent a lesser saphenous vein radiofrequency ablation in December 2015 and is now back for continued follow-up.  He does continue to do well and most of his symptomatology has resolved.  He does complain of some numbness of the heel since the procedure that is unchanged from his last visit.  He has a spot to his posterior calf with erythema noted that has been present for the past 6 months.  He has been wearing compression stockings while he is working.  He has some intermittent cramping to his lower extremities.    /88 (BP Location: Right arm, Patient Position: Sitting, Cuff Size: Adult)   Pulse 68   Ht 193 cm (76\")   Wt 121 kg (267 lb)   SpO2 98%   BMI 32.50 kg/m²      Physical Exam   Constitutional: He is oriented to person, place, and time. He appears well-developed and well-nourished. No distress.   HENT:   Head: Normocephalic and atraumatic.   Mouth/Throat: Oropharynx is clear and moist and mucous membranes are normal.   Eyes: Pupils are equal, round, and reactive to light. No scleral icterus.   Neck: No JVD present. Carotid bruit is not present.   Cardiovascular: Normal rate, regular rhythm, S1 normal, S2 normal, normal heart sounds, intact distal pulses and normal pulses.  Exam reveals no gallop and no friction rub.    No murmur heard.  Pulses:       Femoral pulses are 2+ on the right " side, and 2+ on the left side.       Popliteal pulses are 2+ on the right side, and 2+ on the left side.        Dorsalis pedis pulses are 2+ on the right side, and 2+ on the left side.        Posterior tibial pulses are 2+ on the right side, and 2+ on the left side.   Pulmonary/Chest: Effort normal and breath sounds normal.   Abdominal: Soft. Normal appearance, normal aorta and bowel sounds are normal. He exhibits no abdominal bruit. There is no hepatosplenomegaly. There is no tenderness.   Musculoskeletal: Normal range of motion.     Vascular Status -  His right foot exhibits no edema. His left foot exhibits no edema.  Neurological: He is alert and oriented to person, place, and time. He has normal strength. No cranial nerve deficit.   Skin: Skin is warm, dry and intact. He is not diaphoretic.   Psychiatric: He has a normal mood and affect. His behavior is normal. Judgment and thought content normal. Cognition and memory are normal.   Nursing note and vitals reviewed.      Patient Active Problem List   Diagnosis   • Pulse irregularity   • Cough   • Venous insufficiency   • Right upper quadrant abdominal pain   • Wellness examination         ICD-10-CM ICD-9-CM   1. Venous insufficiency I87.2 459.81       PLAN: After thoroughly evaluating Omid Jarquin, I believe the best course of action is to remain conservative from vascular standpoint.  We will see him back as needed.  Currently, he has no symptoms regarding venous insufficiency.  He does still have some numbness to his left heel which at this point is permanent.  As far as the small area to his left posterior calf, he can apply warm compresses to.   He should continue wearing compression stockings daily.  The patient is to continue taking their medications as previously discussed.   This was all discussed in full with complete understanding.  Thank you for allowing me to participate in the care of your patient.  Please do not hesitate to call with any questions  or concerns.  We will keep you aware of any further encounters with Omid Jarquin.      Sincerely Yours,        ZACARIAS Khan

## 2018-05-11 ENCOUNTER — OFFICE VISIT (OUTPATIENT)
Dept: GASTROENTEROLOGY | Facility: CLINIC | Age: 43
End: 2018-05-11

## 2018-05-11 ENCOUNTER — APPOINTMENT (OUTPATIENT)
Dept: LAB | Facility: HOSPITAL | Age: 43
End: 2018-05-11

## 2018-05-11 VITALS
BODY MASS INDEX: 32.39 KG/M2 | WEIGHT: 266 LBS | OXYGEN SATURATION: 99 % | SYSTOLIC BLOOD PRESSURE: 132 MMHG | HEIGHT: 76 IN | DIASTOLIC BLOOD PRESSURE: 80 MMHG | HEART RATE: 74 BPM

## 2018-05-11 DIAGNOSIS — R19.7 DIARRHEA IN ADULT PATIENT: Primary | ICD-10-CM

## 2018-05-11 DIAGNOSIS — Z78.9 NONSMOKER: ICD-10-CM

## 2018-05-11 DIAGNOSIS — R19.4 CHANGE IN BOWEL HABITS: ICD-10-CM

## 2018-05-11 DIAGNOSIS — E66.9 OBESITY, UNSPECIFIED OBESITY SEVERITY, UNSPECIFIED OBESITY TYPE: ICD-10-CM

## 2018-05-11 LAB
ADV 40+41 DNA STL QL NAA+NON-PROBE: NOT DETECTED
ASTRO TYP 1-8 RNA STL QL NAA+NON-PROBE: NOT DETECTED
C CAYETANENSIS DNA STL QL NAA+NON-PROBE: NOT DETECTED
C DIFF TOX GENS STL QL NAA+PROBE: DETECTED
CAMPY SP DNA.DIARRHEA STL QL NAA+PROBE: NOT DETECTED
CRYPTOSP STL CULT: NOT DETECTED
E COLI DNA SPEC QL NAA+PROBE: NOT DETECTED
E HISTOLYT AG STL-ACNC: NOT DETECTED
EAEC PAA PLAS AGGR+AATA ST NAA+NON-PRB: NOT DETECTED
EC STX1+STX2 GENES STL QL NAA+NON-PROBE: NOT DETECTED
EPEC EAE GENE STL QL NAA+NON-PROBE: NOT DETECTED
ETEC LTA+ST1A+ST1B TOX ST NAA+NON-PROBE: NOT DETECTED
G LAMBLIA DNA SPEC QL NAA+PROBE: NOT DETECTED
NOROVIRUS GI+II RNA STL QL NAA+NON-PROBE: NOT DETECTED
P SHIGELLOIDES DNA STL QL NAA+NON-PROBE: NOT DETECTED
RV RNA STL NAA+PROBE: NOT DETECTED
SALMONELLA DNA SPEC QL NAA+PROBE: NOT DETECTED
SAPO I+II+IV+V RNA STL QL NAA+NON-PROBE: NOT DETECTED
SHIGELLA SP+EIEC IPAH ST NAA+NON-PROBE: NOT DETECTED
V CHOLERAE DNA SPEC QL NAA+PROBE: NOT DETECTED
VIBRIO DNA SPEC NAA+PROBE: NOT DETECTED
WBC STL QL MICRO: NORMAL
YERSINIA STL CULT: NOT DETECTED

## 2018-05-11 PROCEDURE — 99214 OFFICE O/P EST MOD 30 MIN: CPT | Performed by: CLINICAL NURSE SPECIALIST

## 2018-05-11 PROCEDURE — 87507 IADNA-DNA/RNA PROBE TQ 12-25: CPT | Performed by: CLINICAL NURSE SPECIALIST

## 2018-05-11 PROCEDURE — 87205 SMEAR GRAM STAIN: CPT | Performed by: CLINICAL NURSE SPECIALIST

## 2018-05-11 RX ORDER — SODIUM, POTASSIUM,MAG SULFATES 17.5-3.13G
SOLUTION, RECONSTITUTED, ORAL ORAL
Qty: 2 BOTTLE | Refills: 0 | Status: ON HOLD | OUTPATIENT
Start: 2018-05-11 | End: 2018-06-12

## 2018-05-11 NOTE — PROGRESS NOTES
Omid Jarquin  1975 5/11/2018  Chief Complaint   Patient presents with   • GI Problem     Loose stools stomach doesn't feel right     Subjective   HPI  Omid Jarquin is a 42 y.o. male who presents as a referral for complaints of change in bowels to more of a loose bowel going a few more times per day up to 6 starting approx 8 weeks ago. He has had leakage at times as well with what he thought was gas. He has mucoid stool as well.  He has been on antibiotics recently for respiratory infection 3 to 4 months ago.  He has had a colonoscopy in the past 8/23/2007 showing hemorrhoids no evidence for proctitis. He has a hx of proctitis from 2002.  He has no complaints of nausea or vomiting. No change in bowels. No wt loss. No BRBPR. No melena. There is NO family hx for colon cancer. No abdominal pain.  Past Medical History:   Diagnosis Date   • Allergic rhinitis    • Chronic rhinitis    • Deviated nasal septum    • Eustachian tube dysfunction    • GERD (gastroesophageal reflux disease)    • Hypertrophy of nasal turbinates    • LPRD (laryngopharyngeal reflux disease)    • Sensorineural hearing loss    • Shift work sleep disorder    • Tinnitus    • Venous insufficiency      Past Surgical History:   Procedure Laterality Date   • COLONOSCOPY  08/23/2007    Normal exam   • COLONOSCOPY  09/05/2002    [facility] chronic proctitis rectum   • ENDOSCOPY AND COLONOSCOPY  2001    Normal endo / Multiple aphthous ulcers in the rectum mild active proctitis   • HERNIA REPAIR      umbilical   • SEPTOPLASTY     • SINUS SURGERY  2000    [facility]   • TONSILLECTOMY  30yrs    [facility]     Outpatient Prescriptions Marked as Taking for the 5/11/18 encounter (Office Visit) with ZACARIAS Manley   Medication Sig Dispense Refill   • cetirizine (zyrTEC) 10 MG tablet Take 10 mg by mouth Daily.     • omeprazole (priLOSEC) 40 MG capsule TAKE 1 CAPSULE EVERY MORNING ON EMPTY STOMACH GENERIC FOR PRILOSEC 90 capsule 1     Allergies   Allergen  "Reactions   • Augmentin [Amoxicillin-Pot Clavulanate] Diarrhea   • Avelox [Moxifloxacin] Itching   • Levaquin [Levofloxacin In D5w] Nausea Only     Social History     Social History   • Marital status:      Spouse name: N/A   • Number of children: N/A   • Years of education: N/A     Occupational History   • Not on file.     Social History Main Topics   • Smoking status: Never Smoker   • Smokeless tobacco: Never Used   • Alcohol use Yes      Comment: occasional   • Drug use: No   • Sexual activity: Defer     Other Topics Concern   • Not on file     Social History Narrative   • No narrative on file     Family History   Problem Relation Age of Onset   • No Known Problems Mother    • Cancer Father      skin   • Colon cancer Father    • Colon polyps Father      Health Maintenance   Topic Date Due   • TDAP/TD VACCINES (1 - Tdap) 05/20/1992   • INFLUENZA VACCINE  08/01/2018   • ANNUAL PHYSICAL  11/23/2018       REVIEW OF SYSTEMS  General: well appearing, no fever chills or sweats, no unexplained wt loss  HEENT: no acute visual or hearing disturbances  Cardiovascular: No chest pain or palpitations  Pulmonary: No shortness of breath, coughing, wheezing or hemoptysis  : No burning, urgency, hematuria, or dysuria  Musculoskeletal: No joint pain or stiffness  Peripheral: no edema  Skin: No lesions or rashes  Neuro: No dizziness, headaches, stroke, syncope  Endocrine: No hot or cold intolerances  Hematological: No blood dyscrasias    Objective   Vitals:    05/11/18 1019   BP: 132/80   Pulse: 74   SpO2: 99%   Weight: 121 kg (266 lb)   Height: 193 cm (76\")     Body mass index is 32.38 kg/m².  Patient's Body mass index is 32.38 kg/m². BMI is above normal parameters. Recommendations include: nutrition counseling.      PHYSICAL EXAM  General: age appropriate well nourished well appearing, no acute distress  Head: normocephalic and atraumatic  Global assessment-supple  Neck-No JVD noted, no lymphadenopathy  Pulmonary-clear " to auscultation bilaterally, normal respiratory effort  Cardiovascular-normal rate and rhythm, normal heart sounds, S1 and S2 noted  Abdomen-soft, non tender, non distended, normal bowel sounds all 4 quadrants, no hepatosplenomegaly noted  Extremities-No clubbing cyanosis or edema  Neuro-Non focal, converses appropriately, awake, alert, oriented    Assessment/Plan     Omid was seen today for gi problem.    Diagnoses and all orders for this visit:    Diarrhea in adult patient  -     Gastrointestinal Panel, PCR - Stool, Per Rectum  -     Fecal Leukocytes - Stool, Per Rectum  -     Case Request; Standing  -     Implement Anesthesia Orders Day of Procedure; Standing  -     Obtain Informed Consent; Standing  -     Verify bowel prep was successful; Standing  -     Case Request  -     SUPREP BOWEL PREP KIT 17.5-3.13-1.6 GM/180ML solution oral solution; Take as directed by office instructions provided    Change in bowel habits    Obesity, unspecified obesity severity, unspecified obesity type    Nonsmoker    No dairy products, no artificial sweeteners. I also suggest a fiber regimen to include metamucil. No NSAIDs which he says he is not taking.     COLONOSCOPY WITH ANESTHESIA (N/A)  Body mass index is 32.38 kg/m².    Patient instructions on prep prior to procedure provided to the patient.     All risks, benefits, alternatives, and indications of colonoscopy procedure have been discussed with the patient. Risks to include perforation of the colon requiring possible surgery or colostomy, risk of bleeding from biopsies or removal of colon tissue, possibility of missing a colon polyp or cancer, or adverse drug reaction.  Benefits to include the diagnosis and management of disease of the colon and rectum. Alternatives to include barium enema, radiographic evaluation, lab testing or no intervention. Pt verbalizes understanding and agrees.     Tracy Henson, APRN  5/11/2018  10:36 AM      IF YOU SMOKE OR USE TOBACCO PLEASE  READ THE FOLLOWIN minutes reading provided    Why is smoking bad for me?  Smoking increases the risk of heart disease, lung disease, vascular disease, stroke, and cancer.     If you smoke, STOP!    If you would like more information on quitting smoking, please visit the [employer] website: [email or web address]   This link will provide additional resources including the QUIT line and the Beat the Pack support groups.     For more information:    Quit Now [facility]  -QUIT-NOW  [email or web address]    Obesity, Adult  Obesity is the condition of having too much total body fat. Being overweight or obese means that your weight is greater than what is considered healthy for your body size. Obesity is determined by a measurement called BMI. BMI is an estimate of body fat and is calculated from height and weight. For adults, a BMI of 30 or higher is considered obese.  Obesity can eventually lead to other health concerns and major illnesses, including:  · Stroke.  · Coronary artery disease (CAD).  · Type 2 diabetes.  · Some types of cancer, including cancers of the colon, breast, uterus, and gallbladder.  · Osteoarthritis.  · High blood pressure (hypertension).  · High cholesterol.  · Sleep apnea.  · Gallbladder stones.  · Infertility problems.  What are the causes?  The main cause of obesity is taking in (consuming) more calories than your body uses for energy. Other factors that contribute to this condition may include:  · Being born with genes that make you more likely to become obese.  · Having a medical condition that causes obesity. These conditions include:  ¨ Hypothyroidism.  ¨ Polycystic ovarian syndrome (PCOS).  ¨ Binge-eating disorder.  ¨ Cushing syndrome.  · Taking certain medicines, such as steroids, antidepressants, and seizure medicines.  · Not being physically active (sedentary lifestyle).  · Living where there are limited places to  exercise safely or buy healthy foods.  · Not getting enough sleep.  What increases the risk?  The following factors may increase your risk of this condition:  · Having a family history of obesity.  · Being a woman of -American descent.  · Being a man of  descent.  What are the signs or symptoms?  Having excessive body fat is the main symptom of this condition.  How is this diagnosed?  This condition may be diagnosed based on:  · Your symptoms.  · Your medical history.  · A physical exam. Your health care provider may measure:  ¨ Your BMI. If you are an adult with a BMI between 25 and less than 30, you are considered overweight. If you are an adult with a BMI of 30 or higher, you are considered obese.  ¨ The distances around your hips and your waist (circumferences). These may be compared to each other to help diagnose your condition.  ¨ Your skinfold thickness. Your health care provider may gently pinch a fold of your skin and measure it.  How is this treated?  Treatment for this condition often includes changing your lifestyle. Treatment may include some or all of the following:  · Dietary changes. Work with your health care provider and a dietitian to set a weight-loss goal that is healthy and reasonable for you. Dietary changes may include eating:  ¨ Smaller portions. A portion size is the amount of a particular food that is healthy for you to eat at one time. This varies from person to person.  ¨ Low-calorie or low-fat options.  ¨ More whole grains, fruits, and vegetables.  · Regular physical activity. This may include aerobic activity (cardio) and strength training.  · Medicine to help you lose weight. Your health care provider may prescribe medicine if you are unable to lose 1 pound a week after 6 weeks of eating more healthily and doing more physical activity.  · Surgery. Surgical options may include gastric banding and gastric bypass. Surgery may be done if:  ¨ Other treatments have not helped  to improve your condition.  ¨ You have a BMI of 40 or higher.  ¨ You have life-threatening health problems related to obesity.  Follow these instructions at home:     Eating and drinking     · Follow recommendations from your health care provider about what you eat and drink. Your health care provider may advise you to:  ¨ Limit fast foods, sweets, and processed snack foods.  ¨ Choose low-fat options, such as low-fat milk instead of whole milk.  ¨ Eat 5 or more servings of fruits or vegetables every day.  ¨ Eat at home more often. This gives you more control over what you eat.  ¨ Choose healthy foods when you eat out.  ¨ Learn what a healthy portion size is.  ¨ Keep low-fat snacks on hand.  ¨ Avoid sugary drinks, such as soda, fruit juice, iced tea sweetened with sugar, and flavored milk.  ¨ Eat a healthy breakfast.  · Drink enough water to keep your urine clear or pale yellow.  · Do not go without eating for long periods of time (do not fast) or follow a fad diet. Fasting and fad diets can be unhealthy and even dangerous.  Physical Activity   · Exercise regularly, as told by your health care provider. Ask your health care provider what types of exercise are safe for you and how often you should exercise.  · Warm up and stretch before being active.  · Cool down and stretch after being active.  · Rest between periods of activity.  Lifestyle   · Limit the time that you spend in front of your TV, computer, or video game system.  · Find ways to reward yourself that do not involve food.  · Limit alcohol intake to no more than 1 drink a day for nonpregnant women and 2 drinks a day for men. One drink equals 12 oz of beer, 5 oz of wine, or 1½ oz of hard liquor.  General instructions   · Keep a weight loss journal to keep track of the food you eat and how much you exercise you get.  · Take over-the-counter and prescription medicines only as told by your health care provider.  · Take vitamins and supplements only as told by  your health care provider.  · Consider joining a support group. Your health care provider may be able to recommend a support group.  · Keep all follow-up visits as told by your health care provider. This is important.  Contact a health care provider if:  · You are unable to meet your weight loss goal after 6 weeks of dietary and lifestyle changes.  This information is not intended to replace advice given to you by your health care provider. Make sure you discuss any questions you have with your health care provider.  Document Released: 01/25/2006 Document Revised: 05/22/2017 Document Reviewed: 10/05/2016  Elsevier Interactive Patient Education © 2017 [employer] Inc.

## 2018-05-14 DIAGNOSIS — A04.72 CLOSTRIDIUM DIFFICILE DIARRHEA: Primary | ICD-10-CM

## 2018-05-14 RX ORDER — METRONIDAZOLE 500 MG/1
500 TABLET ORAL 3 TIMES DAILY
Qty: 30 TABLET | Refills: 0 | Status: ON HOLD | OUTPATIENT
Start: 2018-05-14 | End: 2018-06-12

## 2018-05-16 NOTE — PROGRESS NOTES
Patient notified, he will call to schedule follow up.  He is on a fishing trip and will start his meds tomorrow.

## 2018-06-12 ENCOUNTER — ANESTHESIA (OUTPATIENT)
Dept: GASTROENTEROLOGY | Facility: HOSPITAL | Age: 43
End: 2018-06-12

## 2018-06-12 ENCOUNTER — HOSPITAL ENCOUNTER (OUTPATIENT)
Facility: HOSPITAL | Age: 43
Setting detail: HOSPITAL OUTPATIENT SURGERY
Discharge: HOME | End: 2018-06-12
Attending: INTERNAL MEDICINE | Admitting: INTERNAL MEDICINE

## 2018-06-12 ENCOUNTER — ANESTHESIA EVENT (OUTPATIENT)
Dept: GASTROENTEROLOGY | Facility: HOSPITAL | Age: 43
End: 2018-06-12

## 2018-06-12 VITALS
SYSTOLIC BLOOD PRESSURE: 119 MMHG | RESPIRATION RATE: 12 BRPM | OXYGEN SATURATION: 99 % | TEMPERATURE: 97.4 F | WEIGHT: 256 LBS | HEART RATE: 63 BPM | DIASTOLIC BLOOD PRESSURE: 78 MMHG | HEIGHT: 76 IN | BODY MASS INDEX: 31.17 KG/M2

## 2018-06-12 DIAGNOSIS — R19.7 DIARRHEA IN ADULT PATIENT: ICD-10-CM

## 2018-06-12 PROCEDURE — 25010000002 PROPOFOL 10 MG/ML EMULSION: Performed by: NURSE ANESTHETIST, CERTIFIED REGISTERED

## 2018-06-12 PROCEDURE — 45378 DIAGNOSTIC COLONOSCOPY: CPT | Performed by: INTERNAL MEDICINE

## 2018-06-12 RX ORDER — SODIUM CHLORIDE 0.9 % (FLUSH) 0.9 %
3 SYRINGE (ML) INJECTION AS NEEDED
Status: DISCONTINUED | OUTPATIENT
Start: 2018-06-12 | End: 2018-06-12 | Stop reason: HOSPADM

## 2018-06-12 RX ORDER — SODIUM CHLORIDE 9 MG/ML
500 INJECTION, SOLUTION INTRAVENOUS CONTINUOUS PRN
Status: DISCONTINUED | OUTPATIENT
Start: 2018-06-12 | End: 2018-06-12 | Stop reason: HOSPADM

## 2018-06-12 RX ORDER — LIDOCAINE HYDROCHLORIDE 20 MG/ML
INJECTION, SOLUTION INFILTRATION; PERINEURAL AS NEEDED
Status: DISCONTINUED | OUTPATIENT
Start: 2018-06-12 | End: 2018-06-12 | Stop reason: SURG

## 2018-06-12 RX ORDER — PROPOFOL 10 MG/ML
VIAL (ML) INTRAVENOUS AS NEEDED
Status: DISCONTINUED | OUTPATIENT
Start: 2018-06-12 | End: 2018-06-12 | Stop reason: SURG

## 2018-06-12 RX ADMIN — LIDOCAINE HYDROCHLORIDE 0.5 ML: 10 INJECTION, SOLUTION EPIDURAL; INFILTRATION; INTRACAUDAL; PERINEURAL at 12:44

## 2018-06-12 RX ADMIN — LIDOCAINE HYDROCHLORIDE 50 MG: 20 INJECTION, SOLUTION INFILTRATION; PERINEURAL at 13:40

## 2018-06-12 RX ADMIN — SODIUM CHLORIDE 500 ML: 9 INJECTION, SOLUTION INTRAVENOUS at 12:44

## 2018-06-12 RX ADMIN — PROPOFOL 300 MG: 10 INJECTION, EMULSION INTRAVENOUS at 13:40

## 2018-06-12 NOTE — H&P
[facility]  Pre Procedure History & Physical    Chief Complaint:   Diarrhea history of C. difficile    Subjective     HPI:   Here for colonoscopy.  History of C. difficile infection.  Symptoms have improved after treatment with antibiotics.    Past Medical History:   Past Medical History:   Diagnosis Date   • Allergic rhinitis    • C. difficile colitis    • Chronic rhinitis    • Deviated nasal septum    • Eustachian tube dysfunction    • GERD (gastroesophageal reflux disease)    • Hypertrophy of nasal turbinates    • LPRD (laryngopharyngeal reflux disease)    • Sensorineural hearing loss    • Shift work sleep disorder    • Tinnitus    • Venous insufficiency        Past Surgical History:  Past Surgical History:   Procedure Laterality Date   • COLONOSCOPY  08/23/2007    Normal exam   • COLONOSCOPY  09/05/2002    [facility][1] chronic proctitis rectum   • ENDOSCOPY AND COLONOSCOPY  2001    Normal endo / Multiple aphthous ulcers in the rectum mild active proctitis   • HERNIA REPAIR      umbilical   • SEPTOPLASTY     • SINUS SURGERY  2000    [facility]   • TONSILLECTOMY  30yrs    [facility]       Family History:  Family History   Problem Relation Age of Onset   • No Known Problems Mother    • Cancer Father         skin   • Colon cancer Father    • Colon polyps Father        Social History:   reports that he has never smoked. He has never used smokeless tobacco. He reports that he drinks alcohol. He reports that he does not use drugs.    Medications:   Prior to Admission medications    Medication Sig Start Date End Date Taking? Authorizing Provider   cetirizine (zyrTEC) 10 MG tablet Take 10 mg by mouth Daily.   Yes Historical Provider, MD   omeprazole (priLOSEC) 40 MG capsule TAKE 1 CAPSULE EVERY MORNING ON EMPTY STOMACH GENERIC FOR PRILOSEC 3/30/18  Yes Sixto Leung MD   SUPREP BOWEL PREP KIT 17.5-3.13-1.6 GM/180ML solution oral solution Take as directed by office instructions provided 5/11/18 6/12/18 Yes  "ZACARIAS Manley   metroNIDAZOLE (FLAGYL) 500 MG tablet Take 1 tablet by mouth 3 (Three) Times a Day. 5/14/18 6/12/18  ZACARIAS Manley       Allergies:  Augmentin [amoxicillin-pot clavulanate]; Avelox [moxifloxacin]; and Levaquin [levofloxacin in d5w]    Objective     Blood pressure 145/87, pulse 65, temperature 97.4 °F (36.3 °C), temperature source Temporal Artery , resp. rate 18, height 193 cm (76\"), weight 116 kg (256 lb), SpO2 100 %.    Physical Exam   Constitutional: Pt is oriented to person, place, and in no distress.   HENT: Mouth/Throat: Oropharynx is clear.   Cardiovascular: Normal rate, regular rhythm.    Pulmonary/Chest: Effort normal. No respiratory distress. No  wheezes.   Abdominal: Soft. Non-distended.  Skin: Skin is warm and dry.   Psychiatric: Mood, memory, affect and judgment appear normal.     Assessment/Plan     Diagnosis:  C. difficile.    Anticipated Surgical Procedure:    Proceed with colonoscopy as scheduled    The following major R/B/A were discussed with the patient, however the list is not all inclusive . Risk:  Bleeding (immediate and delayed), perforation (rupture or tear), reaction to medication, missed lesion/cancer, pain during the procedure, infection, need for surgery, need for ostomy, need for mechanical ventilation (breathing machine), death.  Benefits: removal of polyp/tissue, burn/clip/or inject to stop bleeding, removal of foreign body, dilate any stricture.  Alternatives: Xray or CT, surgery, do nothing with associated risk   The patient was given time to ask question and received explanation, and agrees to proceed as per History and Physical.   No guarantee given or expressed.    EMR Dragon/transcription disclaimer: Much of this encounter note is an electronic transcription/translation of spoken language to printed text.  The electronic translation of spoken language may permit erroneous, or at times, nonsensical words or phrases to be inadvertently " transcribed.  Although I have reviewed the note for such errors, some may still exist.    Fritz Chambers MD  1:47 PM  6/12/2018

## 2018-06-12 NOTE — ANESTHESIA POSTPROCEDURE EVALUATION
Patient: Omid Jarquin    Procedure Summary     Date:  06/12/18 Room / Location:  [facility] ENDOSCOPY 6 / [facility]    Anesthesia Start:  1344 Anesthesia Stop:  1400    Procedure:  COLONOSCOPY WITH ANESTHESIA (N/A ) Diagnosis:       Diarrhea in adult patient      (Diarrhea in adult patient [R19.7])    Surgeon:  Fritz Chambers MD Provider:  Chris Ralph CRNA    Anesthesia Type:  general ASA Status:  2          Anesthesia Type: general  Last vitals  BP   145/87 (06/12/18 1159)   Temp   97.4 °F (36.3 °C) (06/12/18 1159)   Pulse   65 (06/12/18 1159)   Resp   18 (06/12/18 1159)     SpO2   100 % (06/12/18 1159)     Post Anesthesia Care and Evaluation    Patient location during evaluation: PACU  Patient participation: complete - patient participated  Level of consciousness: awake and awake and alert  Pain score: 0  Pain management: adequate  Airway patency: patent  Anesthetic complications: No anesthetic complications    Cardiovascular status: acceptable and stable  Respiratory status: acceptable and unassisted  Hydration status: acceptable

## 2018-06-12 NOTE — ANESTHESIA PREPROCEDURE EVALUATION
Anesthesia Evaluation     Patient summary reviewed and Nursing notes reviewed   no history of anesthetic complications:  NPO Solid Status: > 8 hours  NPO Liquid Status: > 2 hours           Airway   Mallampati: III  TM distance: >3 FB  Neck ROM: full  Possible difficult intubation  Dental      Pulmonary     breath sounds clear to auscultation  Cardiovascular   Exercise tolerance: good (4-7 METS)    Rhythm: regular  Rate: normal      ROS comment: Normal holter 2/2017    Neuro/Psych  GI/Hepatic/Renal/Endo    (+) obesity,  GERD,      Musculoskeletal     Abdominal    Substance History      OB/GYN          Other                        Anesthesia Plan    ASA 2     general     intravenous induction   Anesthetic plan and risks discussed with patient.

## 2018-06-20 ENCOUNTER — TELEPHONE (OUTPATIENT)
Dept: GASTROENTEROLOGY | Facility: CLINIC | Age: 43
End: 2018-06-20

## 2018-09-27 RX ORDER — OMEPRAZOLE 40 MG/1
CAPSULE, DELAYED RELEASE ORAL
Qty: 90 CAPSULE | Refills: 0 | Status: SHIPPED | OUTPATIENT
Start: 2018-09-27 | End: 2018-12-27 | Stop reason: SDUPTHER

## 2018-10-25 ENCOUNTER — TELEPHONE (OUTPATIENT)
Dept: FAMILY MEDICINE CLINIC | Facility: CLINIC | Age: 43
End: 2018-10-25

## 2018-10-25 RX ORDER — CLARITHROMYCIN 500 MG/1
500 TABLET, COATED ORAL EVERY 12 HOURS SCHEDULED
Qty: 20 TABLET | Refills: 0 | Status: SHIPPED | OUTPATIENT
Start: 2018-10-25 | End: 2018-11-04

## 2018-10-25 NOTE — TELEPHONE ENCOUNTER
Pt called he has a sinus inf with sore throat he wants to know if u will call in anbtc biaxin to md2

## 2018-11-28 ENCOUNTER — OFFICE VISIT (OUTPATIENT)
Dept: FAMILY MEDICINE CLINIC | Facility: CLINIC | Age: 43
End: 2018-11-28

## 2018-11-28 VITALS
HEIGHT: 76 IN | OXYGEN SATURATION: 98 % | RESPIRATION RATE: 16 BRPM | WEIGHT: 260 LBS | BODY MASS INDEX: 31.66 KG/M2 | SYSTOLIC BLOOD PRESSURE: 122 MMHG | HEART RATE: 107 BPM | DIASTOLIC BLOOD PRESSURE: 80 MMHG

## 2018-11-28 DIAGNOSIS — G47.33 OSA (OBSTRUCTIVE SLEEP APNEA): ICD-10-CM

## 2018-11-28 DIAGNOSIS — E66.9 OBESITY, UNSPECIFIED OBESITY SEVERITY, UNSPECIFIED OBESITY TYPE: Primary | ICD-10-CM

## 2018-11-28 PROCEDURE — 99396 PREV VISIT EST AGE 40-64: CPT | Performed by: FAMILY MEDICINE

## 2018-11-28 NOTE — PROGRESS NOTES
"Subjective   Omid Jarquin is a 43 y.o. male.     Chief Complaint   Patient presents with   • Annual Exam     checkup        History of Present Illness     he is walking now for fitness----  He is  Snoring at night    Current Outpatient Medications:   •  cetirizine (zyrTEC) 10 MG tablet, Take 10 mg by mouth Daily., Disp: , Rfl:   •  omeprazole (priLOSEC) 40 MG capsule, TAKE 1 CAPSULE EVERY MORNING ON EMPTY STOMACH GENERIC FOR PRILOSEC, Disp: 90 capsule, Rfl: 0  Allergies   Allergen Reactions   • Augmentin [Amoxicillin-Pot Clavulanate] Diarrhea   • Avelox [Moxifloxacin] Itching   • Levaquin [Levofloxacin In D5w] Nausea Only       Past Medical History:   Diagnosis Date   • Allergic rhinitis    • C. difficile colitis    • Chronic rhinitis    • Deviated nasal septum    • Eustachian tube dysfunction    • GERD (gastroesophageal reflux disease)    • Hypertrophy of nasal turbinates    • LPRD (laryngopharyngeal reflux disease)    • Sensorineural hearing loss    • Shift work sleep disorder    • Tinnitus    • Venous insufficiency      Past Surgical History:   Procedure Laterality Date   • COLONOSCOPY  08/23/2007    Normal exam   • COLONOSCOPY  09/05/2002    [facility] chronic proctitis rectum   • ENDOSCOPY AND COLONOSCOPY  2001    Normal endo / Multiple aphthous ulcers in the rectum mild active proctitis   • HERNIA REPAIR      umbilical   • SEPTOPLASTY     • SINUS SURGERY  2000    resser   • TONSILLECTOMY  30yrs    clay       Review of Systems   Constitutional: Negative.    HENT: Negative.    Eyes: Negative.    Respiratory: Negative.    Cardiovascular: Negative.    Gastrointestinal: Negative.    Endocrine: Negative.    Genitourinary: Negative.    Musculoskeletal: Negative.    Allergic/Immunologic: Negative.    Neurological: Negative.    Hematological: Negative.    Psychiatric/Behavioral: Negative.        Objective  /80   Pulse 107   Resp 16   Ht 193 cm (76\")   Wt 118 kg (260 lb)   SpO2 98%   BMI 31.65 kg/m² "   Physical Exam   Constitutional: He is oriented to person, place, and time. He appears well-developed and well-nourished.   HENT:   Head: Normocephalic and atraumatic.   Right Ear: External ear normal.   Left Ear: External ear normal.   Nose: Nose normal.   Mouth/Throat: Oropharynx is clear and moist.   Eyes: Conjunctivae and EOM are normal. Pupils are equal, round, and reactive to light.   Neck: Normal range of motion. Neck supple.   Cardiovascular: Normal rate, regular rhythm, normal heart sounds and intact distal pulses.   Pulmonary/Chest: Effort normal and breath sounds normal.   Abdominal: Soft. Bowel sounds are normal.   Neurological: He is alert and oriented to person, place, and time.   Skin: Skin is warm. Capillary refill takes less than 2 seconds.   Psychiatric: He has a normal mood and affect. His behavior is normal. Judgment and thought content normal.   Nursing note and vitals reviewed.      Assessment/Plan   Omid was seen today for annual exam.    Diagnoses and all orders for this visit:    Obesity, unspecified obesity severity, unspecified obesity type  -     Home Sleep Study    ADRIAN (obstructive sleep apnea)  -     Home Sleep Study                 Orders Placed This Encounter   Procedures   • Home Sleep Study     Order Specific Question:   May take own meds     Answer:   Yes     Order Specific Question:   If any contraindications for HST are checked, patient may be recommended for in-lab testing. HST is indicated for patients in whom ADRIAN is  suspected diagnosis.     Answer:   Does not apply       Follow up: 6 month(s)

## 2018-12-27 RX ORDER — OMEPRAZOLE 40 MG/1
CAPSULE, DELAYED RELEASE ORAL
Qty: 90 CAPSULE | Refills: 0 | Status: SHIPPED | OUTPATIENT
Start: 2018-12-27 | End: 2019-03-14 | Stop reason: SDUPTHER

## 2019-01-24 ENCOUNTER — OFFICE VISIT (OUTPATIENT)
Dept: FAMILY MEDICINE CLINIC | Facility: CLINIC | Age: 44
End: 2019-01-24

## 2019-01-24 ENCOUNTER — DOCUMENTATION (OUTPATIENT)
Dept: FAMILY MEDICINE CLINIC | Facility: CLINIC | Age: 44
End: 2019-01-24

## 2019-01-24 VITALS
OXYGEN SATURATION: 97 % | RESPIRATION RATE: 16 BRPM | SYSTOLIC BLOOD PRESSURE: 118 MMHG | HEART RATE: 90 BPM | HEIGHT: 76 IN | BODY MASS INDEX: 32.51 KG/M2 | TEMPERATURE: 99.6 F | WEIGHT: 267 LBS | DIASTOLIC BLOOD PRESSURE: 70 MMHG

## 2019-01-24 DIAGNOSIS — J01.90 ACUTE SINUSITIS, RECURRENCE NOT SPECIFIED, UNSPECIFIED LOCATION: Primary | ICD-10-CM

## 2019-01-24 DIAGNOSIS — R52 BODY ACHES: ICD-10-CM

## 2019-01-24 DIAGNOSIS — R05.9 COUGH: ICD-10-CM

## 2019-01-24 PROCEDURE — 87804 INFLUENZA ASSAY W/OPTIC: CPT | Performed by: FAMILY MEDICINE

## 2019-01-24 PROCEDURE — 99213 OFFICE O/P EST LOW 20 MIN: CPT | Performed by: FAMILY MEDICINE

## 2019-01-24 NOTE — PROGRESS NOTES
Subjective   Omid Jarquin is a 43 y.o. male.     Chief Complaint   Patient presents with   • Cough     non productive cough, congestion        History of Present Illness     he notes having sinus pain and pressure with low grad temp and facial pressure      Current Outpatient Medications:   •  cetirizine (zyrTEC) 10 MG tablet, Take 10 mg by mouth Daily., Disp: , Rfl:   •  omeprazole (priLOSEC) 40 MG capsule, TAKE 1 CAPSULE EVERY MORNING ON EMPTY STOMACH GENERIC FOR PRILOSEC, Disp: 90 capsule, Rfl: 0  Allergies   Allergen Reactions   • Augmentin [Amoxicillin-Pot Clavulanate] Diarrhea   • Avelox [Moxifloxacin] Itching   • Levaquin [Levofloxacin In D5w] Nausea Only       Past Medical History:   Diagnosis Date   • Allergic rhinitis    • C. difficile colitis    • Chronic rhinitis    • Deviated nasal septum    • Eustachian tube dysfunction    • GERD (gastroesophageal reflux disease)    • Hypertrophy of nasal turbinates    • LPRD (laryngopharyngeal reflux disease)    • Sensorineural hearing loss    • Shift work sleep disorder    • Tinnitus    • Venous insufficiency      Past Surgical History:   Procedure Laterality Date   • COLONOSCOPY  08/23/2007    Normal exam   • COLONOSCOPY  09/05/2002    [facility] chronic proctitis rectum   • COLONOSCOPY N/A 6/12/2018    Procedure: COLONOSCOPY WITH ANESTHESIA;  Surgeon: Fritz Chambers MD;  Location: [facility] ENDOSCOPY;  Service: Gastroenterology   • ENDOSCOPY AND COLONOSCOPY  2001    Normal endo / Multiple aphthous ulcers in the rectum mild active proctitis   • HERNIA REPAIR      umbilical   • SEPTOPLASTY     • SINUS SURGERY  2000    [facility]er   • TONSILLECTOMY  30yrs    clay       Review of Systems   Constitutional: Positive for fever.   HENT: Positive for congestion, sinus pressure and sinus pain.    Eyes: Negative.    Respiratory: Negative.    Cardiovascular: Negative.    Gastrointestinal: Negative.    Endocrine: Negative.    Genitourinary: Negative.    Musculoskeletal: Negative.   "  Skin: Negative.    Allergic/Immunologic: Negative.    Neurological: Negative.    Hematological: Negative.    Psychiatric/Behavioral: Negative.        Objective  /70   Pulse 90   Temp 99.6 °F (37.6 °C)   Resp 16   Ht 193 cm (76\")   Wt 121 kg (267 lb)   SpO2 97%   BMI 32.50 kg/m²   Physical Exam   Constitutional: He is oriented to person, place, and time. He appears well-developed and well-nourished.   HENT:   Head: Normocephalic and atraumatic.   Right Ear: External ear normal.   Left Ear: External ear normal.   Nose: Nose normal.   Mouth/Throat: Oropharynx is clear and moist.   Eyes: Conjunctivae and EOM are normal. Pupils are equal, round, and reactive to light.   Neck: Normal range of motion. Neck supple.   Cardiovascular: Normal rate, regular rhythm, normal heart sounds and intact distal pulses.   Pulmonary/Chest: Effort normal and breath sounds normal.   Abdominal: Soft. Bowel sounds are normal.   Musculoskeletal: Normal range of motion.   Neurological: He is alert and oriented to person, place, and time.   Skin: Skin is warm. Capillary refill takes less than 2 seconds.   Psychiatric: He has a normal mood and affect. His behavior is normal. Judgment and thought content normal.   Nursing note and vitals reviewed.  flu swab negative    Assessment/Plan   Omid was seen today for cough.    Diagnoses and all orders for this visit:    Acute sinusitis, recurrence not specified, unspecified location      He will start biaxin 500mg bid x 10 days script he has at home---keep me infomrd           No orders of the defined types were placed in this encounter.      Follow up: [rm  "

## 2019-01-25 LAB
EXPIRATION DATE: NORMAL
FLUAV AG NPH QL: NEGATIVE
FLUBV AG NPH QL: NEGATIVE
INTERNAL CONTROL: NORMAL
Lab: NORMAL

## 2019-01-28 ENCOUNTER — TELEPHONE (OUTPATIENT)
Dept: FAMILY MEDICINE CLINIC | Facility: CLINIC | Age: 44
End: 2019-01-28

## 2019-01-28 NOTE — TELEPHONE ENCOUNTER
Wants to know if he can get a work excuse for Wed and Thursday of this week. Cold air aggrevates his illness.

## 2019-02-11 ENCOUNTER — TELEPHONE (OUTPATIENT)
Dept: FAMILY MEDICINE CLINIC | Facility: CLINIC | Age: 44
End: 2019-02-11

## 2019-02-11 RX ORDER — CLARITHROMYCIN 500 MG/1
500 TABLET, COATED ORAL 2 TIMES DAILY
Qty: 20 TABLET | Refills: 0 | Status: SHIPPED | OUTPATIENT
Start: 2019-02-11 | End: 2019-03-14

## 2019-02-11 NOTE — TELEPHONE ENCOUNTER
He has a sinus infection, headache and nasal drainage. Mouth is itchy and has colored phlegm with some blood. Would like to get something to his pharmacy.

## 2019-03-14 RX ORDER — OMEPRAZOLE 40 MG/1
40 CAPSULE, DELAYED RELEASE ORAL DAILY
Qty: 90 CAPSULE | Refills: 3 | Status: SHIPPED | OUTPATIENT
Start: 2019-03-14 | End: 2020-02-14

## 2019-04-26 ENCOUNTER — OFFICE VISIT (OUTPATIENT)
Dept: FAMILY MEDICINE CLINIC | Facility: CLINIC | Age: 44
End: 2019-04-26

## 2019-04-26 ENCOUNTER — TELEPHONE (OUTPATIENT)
Dept: FAMILY MEDICINE CLINIC | Facility: CLINIC | Age: 44
End: 2019-04-26

## 2019-04-26 VITALS
OXYGEN SATURATION: 97 % | HEART RATE: 89 BPM | WEIGHT: 274 LBS | RESPIRATION RATE: 16 BRPM | BODY MASS INDEX: 33.36 KG/M2 | HEIGHT: 76 IN | SYSTOLIC BLOOD PRESSURE: 128 MMHG | DIASTOLIC BLOOD PRESSURE: 86 MMHG

## 2019-04-26 DIAGNOSIS — B02.9 HERPES ZOSTER WITHOUT COMPLICATION: Primary | ICD-10-CM

## 2019-04-26 PROCEDURE — 99214 OFFICE O/P EST MOD 30 MIN: CPT | Performed by: FAMILY MEDICINE

## 2019-04-26 RX ORDER — PREDNISONE 10 MG/1
TABLET ORAL
Qty: 5 TABLET | Refills: 0 | Status: SHIPPED | OUTPATIENT
Start: 2019-04-26 | End: 2019-05-06

## 2019-04-26 RX ORDER — ACYCLOVIR 800 MG/1
TABLET ORAL
Qty: 35 TABLET | Refills: 0 | Status: SHIPPED | OUTPATIENT
Start: 2019-04-26 | End: 2019-05-06

## 2019-04-26 NOTE — PROGRESS NOTES
Subjective   Omid Jarquin is a 43 y.o. male.     Chief Complaint   Patient presents with   • Rash     upper mid back      History of Present Illness     he notes rash on upper back---      Current Outpatient Medications:   •  cetirizine (zyrTEC) 10 MG tablet, Take 10 mg by mouth Daily., Disp: , Rfl:   •  omeprazole (priLOSEC) 40 MG capsule, Take 1 capsule by mouth Daily., Disp: 90 capsule, Rfl: 3  Allergies   Allergen Reactions   • Augmentin [Amoxicillin-Pot Clavulanate] Diarrhea   • Avelox [Moxifloxacin] Itching   • Levaquin [Levofloxacin In D5w] Nausea Only       Past Medical History:   Diagnosis Date   • Allergic rhinitis    • C. difficile colitis    • Chronic rhinitis    • Deviated nasal septum    • Eustachian tube dysfunction    • GERD (gastroesophageal reflux disease)    • Hypertrophy of nasal turbinates    • LPRD (laryngopharyngeal reflux disease)    • Sensorineural hearing loss    • Shift work sleep disorder    • Tinnitus    • Venous insufficiency      Past Surgical History:   Procedure Laterality Date   • COLONOSCOPY  08/23/2007    Normal exam   • COLONOSCOPY  09/05/2002    [facility] chronic proctitis rectum   • COLONOSCOPY N/A 6/12/2018    Procedure: COLONOSCOPY WITH ANESTHESIA;  Surgeon: Fritz Chambers MD;  Location: [facility] ENDOSCOPY;  Service: Gastroenterology   • ENDOSCOPY AND COLONOSCOPY  2001    Normal endo / Multiple aphthous ulcers in the rectum mild active proctitis   • HERNIA REPAIR      umbilical   • SEPTOPLASTY     • SINUS SURGERY  2000    [facility]   • TONSILLECTOMY  30yrs    clay       Review of Systems   Constitutional: Negative.    HENT: Negative.    Eyes: Negative.    Respiratory: Negative.    Cardiovascular: Negative.    Gastrointestinal: Negative.    Endocrine: Negative.    Genitourinary: Negative.    Musculoskeletal: Negative.    Skin: Positive for rash.   Allergic/Immunologic: Negative.    Neurological: Negative.    Hematological: Negative.    Psychiatric/Behavioral: Negative.   "      Objective  /86   Pulse 89   Resp 16   Ht 193 cm (76\")   Wt 124 kg (274 lb)   SpO2 97%   BMI 33.35 kg/m²   Physical Exam   Constitutional: He is oriented to person, place, and time. He appears well-developed and well-nourished.   HENT:   Head: Normocephalic and atraumatic.   Right Ear: External ear normal.   Left Ear: External ear normal.   Nose: Nose normal.   Mouth/Throat: Oropharynx is clear and moist.   Eyes: Conjunctivae and EOM are normal. Pupils are equal, round, and reactive to light.   Neck: Normal range of motion. Neck supple.   Cardiovascular: Normal rate, regular rhythm, normal heart sounds and intact distal pulses.   Pulmonary/Chest: Effort normal and breath sounds normal.   Abdominal: Soft. Bowel sounds are normal.   Musculoskeletal: Normal range of motion.   Neurological: He is alert and oriented to person, place, and time.   Skin: Skin is warm. Capillary refill takes less than 2 seconds. Rash noted.   Psychiatric: He has a normal mood and affect. His behavior is normal. Judgment and thought content normal.   Vitals reviewed.      Assessment/Plan   Omid was seen today for rash.    Diagnoses and all orders for this visit:    Herpes zoster without complication    Other orders  -     acyclovir (ZOVIRAX) 800 MG tablet; One tablet q 4hrs (5 times day) x 7 days  -     predniSONE (DELTASONE) 10 MG tablet; One tablet daily  X 5 days      Keep me infomreed         No orders of the defined types were placed in this encounter.      Follow up: prn  "

## 2019-04-26 NOTE — TELEPHONE ENCOUNTER
Patient is here with a large red area on his back that pt thinks could be poison ivy.    The area has severe blisters and looks like shingles.

## 2019-05-06 ENCOUNTER — OFFICE VISIT (OUTPATIENT)
Dept: OTOLARYNGOLOGY | Facility: CLINIC | Age: 44
End: 2019-05-06

## 2019-05-06 VITALS
SYSTOLIC BLOOD PRESSURE: 137 MMHG | HEIGHT: 73 IN | BODY MASS INDEX: 36.18 KG/M2 | DIASTOLIC BLOOD PRESSURE: 89 MMHG | WEIGHT: 273 LBS | HEART RATE: 69 BPM | TEMPERATURE: 97.7 F

## 2019-05-06 DIAGNOSIS — G47.33 OSA (OBSTRUCTIVE SLEEP APNEA): ICD-10-CM

## 2019-05-06 DIAGNOSIS — J30.9 ALLERGIC RHINITIS, UNSPECIFIED SEASONALITY, UNSPECIFIED TRIGGER: ICD-10-CM

## 2019-05-06 DIAGNOSIS — J32.9 CHRONIC SINUSITIS, UNSPECIFIED LOCATION: ICD-10-CM

## 2019-05-06 DIAGNOSIS — R06.83 SNORING: Primary | ICD-10-CM

## 2019-05-06 PROCEDURE — 99214 OFFICE O/P EST MOD 30 MIN: CPT | Performed by: NURSE PRACTITIONER

## 2019-05-06 RX ORDER — FLUTICASONE PROPIONATE 50 MCG
2 SPRAY, SUSPENSION (ML) NASAL DAILY
Qty: 1 BOTTLE | Refills: 5 | Status: SHIPPED | OUTPATIENT
Start: 2019-05-06 | End: 2021-01-10

## 2019-05-06 NOTE — PATIENT INSTRUCTIONS
Recommend weight loss  Add Flonase  Start CPAP   Consider UPPP if worsening and unable to comply with CPAP therapy    Call for problems or worsening symptoms

## 2019-05-06 NOTE — PROGRESS NOTES
"YOB: 1975  Location: [facility]  Location Address: [address] [place] [address], [facility], [address] [place] [address]  Location Phone: [phone]    Chief Complaint   Patient presents with   • Snoring       History of Present Illness  Omid Jarquin is a 43 y.o. male.  Omid Jarquin is here for follow up of ENT complaints. The patient has had problems with snoring  The symptoms are not localized to a particular location. The patient has had moderate symptoms. The symptoms have been present for the last year The symptoms are aggravated by  no identifiable factors. The symptoms are improved by no identifiable factors.  He has been working very long shifts and his sign other has noticed an increase in snoring.  He feels fairly rested after night of sleep.  He has had some weight gain the last few years.   18 Sleep study: AHI 58 primarily hypopnea  Villalba scale 4  Neck Circum 16\"  BMI 36     Past Medical History:   Diagnosis Date   • Allergic rhinitis    • C. difficile colitis    • Chronic rhinitis    • Deviated nasal septum    • Eustachian tube dysfunction    • GERD (gastroesophageal reflux disease)    • Herpes    • Hypertrophy of nasal turbinates    • LPRD (laryngopharyngeal reflux disease)    • Observed sleep apnea    • Sensorineural hearing loss    • Shift work sleep disorder    • Tinnitus    • Venous insufficiency        Past Surgical History:   Procedure Laterality Date   • COLONOSCOPY  2007    Normal exam   • COLONOSCOPY  2002    [facility] chronic proctitis rectum   • COLONOSCOPY N/A 2018    Procedure: COLONOSCOPY WITH ANESTHESIA;  Surgeon: Fritz Chambers MD;  Location: [facility] ENDOSCOPY;  Service: Gastroenterology   • ENDOSCOPY AND COLONOSCOPY      Normal endo / Multiple aphthous ulcers in the rectum mild active proctitis   • HERNIA REPAIR      umbilical   • SEPTOPLASTY     • SINUS SURGERY      resser   • TONSILLECTOMY  30yrs    clay       Outpatient Medications Marked as Taking " for the 5/6/19 encounter (Office Visit) with Kimberlee Douglass APRN   Medication Sig Dispense Refill   • cetirizine (zyrTEC) 10 MG tablet Take 10 mg by mouth Daily.     • omeprazole (priLOSEC) 40 MG capsule Take 1 capsule by mouth Daily. 90 capsule 3       Augmentin [amoxicillin-pot clavulanate]; Avelox [moxifloxacin]; and Levaquin [levofloxacin in d5w]    Family History   Problem Relation Age of Onset   • No Known Problems Mother    • Cancer Father         skin   • Colon cancer Father    • Colon polyps Father        Social History     Socioeconomic History   • Marital status:      Spouse name: Not on file   • Number of children: Not on file   • Years of education: Not on file   • Highest education level: Not on file   Tobacco Use   • Smoking status: Never Smoker   • Smokeless tobacco: Never Used   Substance and Sexual Activity   • Alcohol use: Yes     Comment: occasional   • Drug use: No   • Sexual activity: Defer       Review of Systems   Constitutional: Negative.    HENT:        SEE HPI   Eyes: Negative.    Respiratory: Negative.    Cardiovascular: Negative.    Gastrointestinal: Negative.    Endocrine: Negative.    Genitourinary: Negative.    Musculoskeletal: Negative.    Skin: Negative.    Allergic/Immunologic: Negative.    Neurological: Negative.    Hematological: Negative.    Psychiatric/Behavioral: Negative.        Vitals:    05/06/19 1022   BP: 137/89   Pulse: 69   Temp: 97.7 °F (36.5 °C)       Body mass index is 36.02 kg/m².    Objective     Physical Exam  CONSTITUTIONAL: well nourished, overweight, alert, oriented, in no acute distress     COMMUNICATION AND VOICE: able to communicate normally, normal voice quality    HEAD: normocephalic, no lesions, atraumatic, no tenderness, no masses     FACE: appearance normal, no lesions, no tenderness, no deformities, facial motion symmetric    SALIVARY GLANDS: parotid glands with no tenderness, no swelling, no masses, submandibular glands with normal size,  nontender    EYES: ocular motility normal, eyelids normal, orbits normal, no proptosis, conjunctiva normal , pupils equal, round     EARS:  Hearing: response to conversational voice normal bilaterally   External Ears: auricles without lesions  Otoscopic: tympanic membrane appearance normal, no lesions, no perforation, normal mobility, no fluid    NOSE:  External Nose: structure normal, no tenderness on palpation, no nasal discharge, no lesions, no evidence of trauma, nostrils patent   Intranasal Exam: nasal mucosa normal, vestibule within normal limits, inferior turbinate normal, nasal septum midline     ORAL:  Lips: upper and lower lips without lesion   Teeth: dentition within normal limits for age   Gums: gingivae healthy   Oral Mucosa: oral mucosa normal, no mucosal lesions   Floor of Mouth: Warthin’s duct patent, mucosa normal  Tongue: lingual mucosa normal without lesions, normal tongue mobility   Palate: soft and hard palates with normal mucosa and structure, malampati scale 2  Oropharynx: oropharyngeal mucosa normal    NECK: neck appearance normal, no mass,  noted without erythema or tenderness    THYROID: no overt thyromegaly, no tenderness, nodules or mass present on palpation, position midline     LYMPH NODES: no lymphadenopathy    CHEST/RESPIRATORY: respiratory effort normal, normal breath sounds     CARDIOVASCULAR: rate and rhythm normal, extremities without cyanosis or edema      NEUROLOGIC/PSYCHIATRIC: oriented to time, place and person, mood normal, affect appropriate, CN II-XII intact grossly    Assessment/Plan   Omid was seen today for snoring.    Diagnoses and all orders for this visit:    Snoring    ADRIAN (obstructive sleep apnea)    Allergic rhinitis, unspecified seasonality, unspecified trigger    Chronic sinusitis, unspecified location    Other orders  -     fluticasone (FLONASE) 50 MCG/ACT nasal spray; 2 sprays into the nostril(s) as directed by provider Daily. Administer 2 sprays in each  nostril for each dose.      * Surgery not found *  No orders of the defined types were placed in this encounter.    Return in about 3 months (around 8/6/2019) for RESSER.       Patient Instructions   Recommend weight loss  Add Flonase  Start CPAP   Consider UPPP if worsening and unable to comply with CPAP therapy    Call for problems or worsening symptoms

## 2019-08-07 ENCOUNTER — OFFICE VISIT (OUTPATIENT)
Dept: OTOLARYNGOLOGY | Facility: CLINIC | Age: 44
End: 2019-08-07

## 2019-08-07 VITALS
SYSTOLIC BLOOD PRESSURE: 138 MMHG | HEART RATE: 64 BPM | BODY MASS INDEX: 36.45 KG/M2 | WEIGHT: 275 LBS | DIASTOLIC BLOOD PRESSURE: 92 MMHG | HEIGHT: 73 IN | TEMPERATURE: 97.4 F

## 2019-08-07 DIAGNOSIS — K21.9 GASTROESOPHAGEAL REFLUX DISEASE, ESOPHAGITIS PRESENCE NOT SPECIFIED: ICD-10-CM

## 2019-08-07 DIAGNOSIS — K21.9 LPRD (LARYNGOPHARYNGEAL REFLUX DISEASE): ICD-10-CM

## 2019-08-07 DIAGNOSIS — G47.33 OSA (OBSTRUCTIVE SLEEP APNEA): Primary | ICD-10-CM

## 2019-08-07 DIAGNOSIS — J30.9 ALLERGIC RHINITIS, UNSPECIFIED SEASONALITY, UNSPECIFIED TRIGGER: ICD-10-CM

## 2019-08-07 PROCEDURE — 99213 OFFICE O/P EST LOW 20 MIN: CPT | Performed by: PHYSICIAN ASSISTANT

## 2019-08-07 NOTE — PROGRESS NOTES
"YOB: 1975  Location: [facility]  Location Address: [address] [place] [address], [facility], [address] [place] [address]  Location Phone: [phone]    Chief Complaint   Patient presents with   • Follow-up     ADRIAN       History of Present Illness  Omid Jarquin is a 44 y.o. male.  Omid Jarquin is here for follow up of ENT complaints. The patient has had problems with snoring.  The symptoms are not localized to a particular location. The patient has had resolved symptoms. The symptoms have been resolved for the last several weeks. The symptoms are aggravated by  no identifiable factors. The symptoms are improved by use of CPAP machine.      The patient reports increased allergy symptoms over the last several days. He has been taking Flonase for this. He takes prilosec for reflux symptoms with well controlled symptoms.    Patient finally got CPAP machine and has been using it for the past 4-6 weeks. He reports improved quality of sleep and his wife reports no further snoring.    18 Sleep study:   AHI 58 primarily hypopnea  [place] scale 4  Neck Circum 16\"  BMI 36    19  [place] scale 3     Past Medical History:   Diagnosis Date   • Allergic rhinitis    • C. difficile colitis    • Chronic rhinitis    • Chronic sinusitis    • Deviated nasal septum    • Eustachian tube dysfunction    • GERD (gastroesophageal reflux disease)    • Herpes    • Hypertrophy of nasal turbinates    • LPRD (laryngopharyngeal reflux disease)    • Observed sleep apnea    • ADRIAN (obstructive sleep apnea)    • Sensorineural hearing loss    • Shift work sleep disorder    • Snoring    • Tinnitus    • Venous insufficiency        Past Surgical History:   Procedure Laterality Date   • COLONOSCOPY  2007    Normal exam   • COLONOSCOPY  2002    [facility] chronic proctitis rectum   • COLONOSCOPY N/A 2018    Procedure: COLONOSCOPY WITH ANESTHESIA;  Surgeon: Fritz Chambers MD;  Location: [facility] ENDOSCOPY;  Service: Gastroenterology   • " ENDOSCOPY AND COLONOSCOPY  2001    Normal endo / Multiple aphthous ulcers in the rectum mild active proctitis   • HERNIA REPAIR      umbilical   • SEPTOPLASTY     • SINUS SURGERY  2000    [facility]   • TONSILLECTOMY  30yrs    [facility]       Outpatient Medications Marked as Taking for the 8/7/19 encounter (Office Visit) with Luis Peraza PA   Medication Sig Dispense Refill   • cetirizine (zyrTEC) 10 MG tablet Take 10 mg by mouth Daily.     • fluticasone (FLONASE) 50 MCG/ACT nasal spray 2 sprays into the nostril(s) as directed by provider Daily. Administer 2 sprays in each nostril for each dose. 1 bottle 5   • omeprazole (priLOSEC) 40 MG capsule Take 1 capsule by mouth Daily. 90 capsule 3       Augmentin [amoxicillin-pot clavulanate]; Avelox [moxifloxacin]; and Levaquin [levofloxacin in d5w]    Family History   Problem Relation Age of Onset   • No Known Problems Mother    • Cancer Father         skin   • Colon cancer Father    • Colon polyps Father        Social History     Socioeconomic History   • Marital status:      Spouse name: Not on file   • Number of children: Not on file   • Years of education: Not on file   • Highest education level: Not on file   Tobacco Use   • Smoking status: Never Smoker   • Smokeless tobacco: Never Used   Substance and Sexual Activity   • Alcohol use: Yes     Comment: occasional   • Drug use: No   • Sexual activity: Defer       Review of Systems   Constitutional: Negative.  Negative for activity change, appetite change, chills, diaphoresis, fatigue, fever and unexpected weight change.   HENT: Negative for congestion, ear discharge, ear pain, facial swelling, hearing loss, mouth sores, nosebleeds, postnasal drip, rhinorrhea, sinus pressure, sneezing, sore throat, tinnitus, trouble swallowing and voice change.    Eyes: Negative.  Negative for pain, discharge, redness, itching and visual disturbance.   Respiratory: Positive for apnea (snoring). Negative for cough, choking,  chest tightness, shortness of breath, wheezing and stridor.    Cardiovascular: Negative.    Gastrointestinal: Negative.  Negative for nausea and vomiting.        GERD   Endocrine: Negative.  Negative for cold intolerance and heat intolerance.   Genitourinary: Negative.    Musculoskeletal: Negative.  Negative for arthralgias, back pain, gait problem, neck pain and neck stiffness.   Skin: Negative.  Negative for rash.   Allergic/Immunologic: Positive for environmental allergies. Negative for food allergies.   Neurological: Negative.  Negative for dizziness, tremors, seizures, syncope, facial asymmetry, speech difficulty, weakness, light-headedness, numbness and headaches.   Hematological: Negative.  Negative for adenopathy. Does not bruise/bleed easily.   Psychiatric/Behavioral: Negative.  Negative for behavioral problems, sleep disturbance and suicidal ideas. The patient is not nervous/anxious and is not hyperactive.        Vitals:    08/07/19 0928   BP: 138/92   Pulse: 64   Temp: 97.4 °F (36.3 °C)       Body mass index is 36.28 kg/m².    Objective     Physical Exam  CONSTITUTIONAL: well nourished, overweight, alert, oriented, in no acute distress     COMMUNICATION AND VOICE: able to communicate normally, normal voice quality    HEAD: normocephalic, no lesions, atraumatic, no tenderness, no masses     FACE: appearance normal, no lesions, no tenderness, no deformities, facial motion symmetric    SALIVARY GLANDS: parotid glands with no tenderness, no swelling, no masses, submandibular glands with normal size, nontender    EYES: ocular motility normal, eyelids normal, orbits normal, no proptosis, conjunctiva normal , pupils equal, round     EARS:  Hearing: response to conversational voice normal bilaterally   External Ears: auricles without lesions  Otoscopic: tympanic membrane appearance normal, no lesions, no perforation, normal mobility, no fluid    NOSE:  External Nose: structure normal, no tenderness on palpation,  no nasal discharge, no lesions, no evidence of trauma, nostrils patent   Intranasal Exam: nasal mucosa normal, vestibule within normal limits, inferior turbinate normal, nasal septum midline     ORAL:  Lips: upper and lower lips without lesion   Teeth: dentition within normal limits for age   Gums: gingivae healthy   Oral Mucosa: oral mucosa normal, no mucosal lesions   Floor of Mouth: Warthin’s duct patent, mucosa normal  Tongue: lingual mucosa normal without lesions, normal tongue mobility   Palate: soft and hard palates with normal mucosa and structure  Oropharynx: oropharyngeal mucosa normal, malampati scale 2    NECK: neck appearance normal, no mass,  noted without erythema or tenderness    THYROID: no overt thyromegaly, no tenderness, nodules or mass present on palpation, position midline     LYMPH NODES: no lymphadenopathy    CHEST/RESPIRATORY: respiratory effort normal, normal breath sounds     CARDIOVASCULAR: rate and rhythm normal, extremities without cyanosis or edema      NEUROLOGIC/PSYCHIATRIC: oriented to time, place and person, mood normal, affect appropriate, CN II-XII intact grossly    Assessment/Plan   Omid was seen today for follow-up.    Diagnoses and all orders for this visit:    ADRIAN (obstructive sleep apnea)    Allergic rhinitis, unspecified seasonality, unspecified trigger    LPRD (laryngopharyngeal reflux disease)    Gastroesophageal reflux disease, esophagitis presence not specified      * Surgery not found *  No orders of the defined types were placed in this encounter.    Return in about 6 months (around 2/7/2020) for Recheck sleep apnea.       Patient Instructions   Continue treatment as directed, if symptoms worsen or develop call for sooner appointment, otherwise recheck in 6 months.

## 2019-08-07 NOTE — PATIENT INSTRUCTIONS
Continue treatment as directed, if symptoms worsen or develop call for sooner appointment, otherwise recheck in 6 months.

## 2019-09-03 RX ORDER — DIPHENOXYLATE HYDROCHLORIDE AND ATROPINE SULFATE 2.5; .025 MG/1; MG/1
1 TABLET ORAL EVERY 6 HOURS PRN
Qty: 10 TABLET | Refills: 0 | Status: SHIPPED | OUTPATIENT
Start: 2019-09-03 | End: 2019-12-16

## 2019-09-03 RX ORDER — DIPHENOXYLATE HYDROCHLORIDE AND ATROPINE SULFATE 2.5; .025 MG/1; MG/1
1 TABLET ORAL EVERY 6 HOURS PRN
Qty: 10 TABLET | Refills: 0 | Status: SHIPPED | OUTPATIENT
Start: 2019-09-03 | End: 2019-09-03 | Stop reason: SDUPTHER

## 2019-09-03 NOTE — TELEPHONE ENCOUNTER
Pt came into office stating since Saturday he has had watery diarrhea only when he eats no cramps no nause he asked if u will call in lomotil and if not better by thlong will give us a call for an appt md2

## 2019-09-03 NOTE — TELEPHONE ENCOUNTER
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  He said that he took one of the pills and then dropped it.  He is asking to have the RX resent because he doesn't want to take the pills after they fell on the floor of the police car.

## 2019-09-04 ENCOUNTER — TELEPHONE (OUTPATIENT)
Dept: FAMILY MEDICINE CLINIC | Facility: CLINIC | Age: 44
End: 2019-09-04

## 2019-09-04 RX ORDER — CLARITHROMYCIN 500 MG/1
500 TABLET, COATED ORAL EVERY 12 HOURS SCHEDULED
Qty: 20 TABLET | Refills: 0 | OUTPATIENT
Start: 2019-09-04 | End: 2019-09-14

## 2019-09-04 NOTE — TELEPHONE ENCOUNTER
Pt called and said that he has sore throat and sinus inf he asked for biaxin 500mg bid x 10 days called into md2

## 2019-09-25 ENCOUNTER — TELEPHONE (OUTPATIENT)
Dept: FAMILY MEDICINE CLINIC | Facility: CLINIC | Age: 44
End: 2019-09-25

## 2019-12-16 ENCOUNTER — OFFICE VISIT (OUTPATIENT)
Dept: FAMILY MEDICINE CLINIC | Facility: CLINIC | Age: 44
End: 2019-12-16

## 2019-12-16 VITALS
DIASTOLIC BLOOD PRESSURE: 84 MMHG | BODY MASS INDEX: 36.31 KG/M2 | HEART RATE: 74 BPM | WEIGHT: 274 LBS | OXYGEN SATURATION: 98 % | HEIGHT: 73 IN | RESPIRATION RATE: 16 BRPM | TEMPERATURE: 98.5 F | SYSTOLIC BLOOD PRESSURE: 130 MMHG

## 2019-12-16 DIAGNOSIS — Z00.00 WELLNESS EXAMINATION: Primary | ICD-10-CM

## 2019-12-16 PROCEDURE — 99396 PREV VISIT EST AGE 40-64: CPT | Performed by: FAMILY MEDICINE

## 2019-12-16 NOTE — PROGRESS NOTES
Subjective   Omid Jarquin is a 44 y.o. male.     Chief Complaint   Patient presents with   • Annual Exam       History of Present Illness     he notes feeling good---sleeps with cpap..doing well      Current Outpatient Medications:   •  cetirizine (zyrTEC) 10 MG tablet, Take 10 mg by mouth Daily., Disp: , Rfl:   •  fluticasone (FLONASE) 50 MCG/ACT nasal spray, 2 sprays into the nostril(s) as directed by provider Daily. Administer 2 sprays in each nostril for each dose., Disp: 1 bottle, Rfl: 5  •  omeprazole (priLOSEC) 40 MG capsule, Take 1 capsule by mouth Daily., Disp: 90 capsule, Rfl: 3  Allergies   Allergen Reactions   • Augmentin [Amoxicillin-Pot Clavulanate] Diarrhea   • Avelox [Moxifloxacin] Itching   • Levaquin [Levofloxacin In D5w] Nausea Only       Past Medical History:   Diagnosis Date   • Allergic rhinitis    • C. difficile colitis    • Chronic rhinitis    • Chronic sinusitis    • Deviated nasal septum    • Eustachian tube dysfunction    • GERD (gastroesophageal reflux disease)    • Herpes    • Hypertrophy of nasal turbinates    • LPRD (laryngopharyngeal reflux disease)    • Observed sleep apnea    • ADRIAN (obstructive sleep apnea)    • Sensorineural hearing loss    • Shift work sleep disorder    • Snoring    • Tinnitus    • Venous insufficiency      Past Surgical History:   Procedure Laterality Date   • COLONOSCOPY  08/23/2007    Normal exam   • COLONOSCOPY  09/05/2002    [facility] chronic proctitis rectum   • COLONOSCOPY N/A 6/12/2018    Procedure: COLONOSCOPY WITH ANESTHESIA;  Surgeon: Fritz Chambers MD;  Location: [facility] ENDOSCOPY;  Service: Gastroenterology   • ENDOSCOPY AND COLONOSCOPY  2001    Normal endo / Multiple aphthous ulcers in the rectum mild active proctitis   • HERNIA REPAIR      umbilical   • SEPTOPLASTY     • SINUS SURGERY  2000    [facility]   • TONSILLECTOMY  30yrs    [facility]       Review of Systems   Constitutional: Negative.    HENT: Negative.    Eyes: Negative.    Respiratory: Negative.   "  Cardiovascular: Negative.    Gastrointestinal: Negative.    Endocrine: Negative.    Genitourinary: Negative.    Musculoskeletal: Negative.    Skin: Negative.    Allergic/Immunologic: Negative.    Neurological: Negative.    Hematological: Negative.    Psychiatric/Behavioral: Negative.        Objective  /84 (BP Location: Left arm)   Pulse 74   Temp 98.5 °F (36.9 °C) (Temporal)   Resp 16   Ht 185.4 cm (73\")   Wt 124 kg (274 lb)   SpO2 98%   BMI 36.15 kg/m²   Physical Exam   Constitutional: He is oriented to person, place, and time. He appears well-developed and well-nourished.   HENT:   Head: Normocephalic and atraumatic.   Right Ear: External ear normal.   Left Ear: External ear normal.   Nose: Nose normal.   Mouth/Throat: Oropharynx is clear and moist.   Eyes: Pupils are equal, round, and reactive to light. Conjunctivae and EOM are normal.   Neck: Normal range of motion. Neck supple.   Cardiovascular: Normal rate, regular rhythm, normal heart sounds and intact distal pulses.   Pulmonary/Chest: Effort normal and breath sounds normal.   Abdominal: Soft. Bowel sounds are normal.   Musculoskeletal: Normal range of motion.   Neurological: He is alert and oriented to person, place, and time.   Skin: Skin is warm. Capillary refill takes less than 2 seconds.   Psychiatric: He has a normal mood and affect. His behavior is normal. Judgment and thought content normal.   Nursing note and vitals reviewed.      Assessment/Plan   Omid was seen today for annual exam.    Diagnoses and all orders for this visit:    Wellness examination  -     PSA Screen  -     CBC w AUTO Differential  -     Comprehensive metabolic panel  -     Lipid Panel With / Chol / HDL Ratio  -     Urinalysis without microscopic (no culture) - Urine, Clean Catch                 Orders Placed This Encounter   Procedures   • PSA Screen   • Comprehensive metabolic panel   • Lipid Panel With / Chol / HDL Ratio   • Urinalysis without microscopic (no " culture) - Urine, Clean Catch   • CBC w AUTO Differential     Order Specific Question:   Manual Differential     Answer:   No   we discussed healthy diet, exercise and importance of weight loss    Follow up: prn

## 2019-12-17 LAB
ALBUMIN SERPL-MCNC: 4.6 G/DL (ref 3.5–5.2)
ALBUMIN/GLOB SERPL: 2.2 G/DL
ALP SERPL-CCNC: 66 U/L (ref 39–117)
ALT SERPL-CCNC: 29 U/L (ref 1–41)
APPEARANCE UR: CLEAR
AST SERPL-CCNC: 17 U/L (ref 1–40)
BASOPHILS # BLD AUTO: 0.05 10*3/MM3 (ref 0–0.2)
BASOPHILS NFR BLD AUTO: 0.8 % (ref 0–1.5)
BILIRUB SERPL-MCNC: 0.5 MG/DL (ref 0.2–1.2)
BILIRUB UR QL STRIP: NEGATIVE
BUN SERPL-MCNC: 14 MG/DL (ref 6–20)
BUN/CREAT SERPL: 14.1 (ref 7–25)
CALCIUM SERPL-MCNC: 9.2 MG/DL (ref 8.6–10.5)
CHLORIDE SERPL-SCNC: 103 MMOL/L (ref 98–107)
CHOLEST SERPL-MCNC: 176 MG/DL (ref 0–200)
CHOLEST/HDLC SERPL: 4 {RATIO}
CO2 SERPL-SCNC: 26.3 MMOL/L (ref 22–29)
COLOR UR: YELLOW
CREAT SERPL-MCNC: 0.99 MG/DL (ref 0.76–1.27)
EOSINOPHIL # BLD AUTO: 0.14 10*3/MM3 (ref 0–0.4)
EOSINOPHIL NFR BLD AUTO: 2.4 % (ref 0.3–6.2)
ERYTHROCYTE [DISTWIDTH] IN BLOOD BY AUTOMATED COUNT: 14.2 % (ref 12.3–15.4)
GLOBULIN SER CALC-MCNC: 2.1 GM/DL
GLUCOSE SERPL-MCNC: 85 MG/DL (ref 65–99)
GLUCOSE UR QL: NEGATIVE
HCT VFR BLD AUTO: 42.4 % (ref 37.5–51)
HDLC SERPL-MCNC: 44 MG/DL (ref 40–60)
HGB BLD-MCNC: 14.1 G/DL (ref 13–17.7)
HGB UR QL STRIP: NEGATIVE
IMM GRANULOCYTES # BLD AUTO: 0.01 10*3/MM3 (ref 0–0.05)
IMM GRANULOCYTES NFR BLD AUTO: 0.2 % (ref 0–0.5)
KETONES UR QL STRIP: NEGATIVE
LDLC SERPL CALC-MCNC: 98 MG/DL (ref 0–100)
LEUKOCYTE ESTERASE UR QL STRIP: NEGATIVE
LYMPHOCYTES # BLD AUTO: 1.68 10*3/MM3 (ref 0.7–3.1)
LYMPHOCYTES NFR BLD AUTO: 28.2 % (ref 19.6–45.3)
MCH RBC QN AUTO: 27.9 PG (ref 26.6–33)
MCHC RBC AUTO-ENTMCNC: 33.3 G/DL (ref 31.5–35.7)
MCV RBC AUTO: 83.8 FL (ref 79–97)
MONOCYTES # BLD AUTO: 0.4 10*3/MM3 (ref 0.1–0.9)
MONOCYTES NFR BLD AUTO: 6.7 % (ref 5–12)
NEUTROPHILS # BLD AUTO: 3.67 10*3/MM3 (ref 1.7–7)
NEUTROPHILS NFR BLD AUTO: 61.7 % (ref 42.7–76)
NITRITE UR QL STRIP: NEGATIVE
NRBC BLD AUTO-RTO: 0 /100 WBC (ref 0–0.2)
PH UR STRIP: 6.5 [PH] (ref 5–8)
PLATELET # BLD AUTO: 242 10*3/MM3 (ref 140–450)
POTASSIUM SERPL-SCNC: 4.4 MMOL/L (ref 3.5–5.2)
PROT SERPL-MCNC: 6.7 G/DL (ref 6–8.5)
PROT UR QL STRIP: NEGATIVE
PSA SERPL-MCNC: 0.89 NG/ML (ref 0–4)
RBC # BLD AUTO: 5.06 10*6/MM3 (ref 4.14–5.8)
SODIUM SERPL-SCNC: 142 MMOL/L (ref 136–145)
SP GR UR: 1.02 (ref 1–1.03)
TRIGL SERPL-MCNC: 169 MG/DL (ref 0–150)
UROBILINOGEN UR STRIP-MCNC: NORMAL MG/DL
VLDLC SERPL CALC-MCNC: 33.8 MG/DL
WBC # BLD AUTO: 5.95 10*3/MM3 (ref 3.4–10.8)

## 2019-12-20 ENCOUNTER — TELEPHONE (OUTPATIENT)
Dept: FAMILY MEDICINE CLINIC | Facility: CLINIC | Age: 44
End: 2019-12-20

## 2019-12-20 RX ORDER — CLARITHROMYCIN 500 MG/1
500 TABLET, COATED ORAL EVERY 12 HOURS SCHEDULED
Qty: 20 TABLET | Refills: 0 | Status: SHIPPED | OUTPATIENT
Start: 2019-12-20 | End: 2019-12-30

## 2019-12-20 NOTE — TELEPHONE ENCOUNTER
Pt called and said that he has bad sinus inf will u call in [facility] md2 [phone]

## 2020-02-05 ENCOUNTER — OFFICE VISIT (OUTPATIENT)
Dept: OTOLARYNGOLOGY | Facility: CLINIC | Age: 45
End: 2020-02-05

## 2020-02-05 VITALS
HEIGHT: 76 IN | TEMPERATURE: 97.6 F | DIASTOLIC BLOOD PRESSURE: 75 MMHG | HEART RATE: 167 BPM | BODY MASS INDEX: 33.58 KG/M2 | RESPIRATION RATE: 16 BRPM | WEIGHT: 275.8 LBS | SYSTOLIC BLOOD PRESSURE: 136 MMHG

## 2020-02-05 DIAGNOSIS — Z99.89 OSA ON CPAP: ICD-10-CM

## 2020-02-05 DIAGNOSIS — J30.9 ALLERGIC RHINITIS, UNSPECIFIED SEASONALITY, UNSPECIFIED TRIGGER: Primary | ICD-10-CM

## 2020-02-05 DIAGNOSIS — G47.33 OSA ON CPAP: ICD-10-CM

## 2020-02-05 DIAGNOSIS — K21.9 LPRD (LARYNGOPHARYNGEAL REFLUX DISEASE): ICD-10-CM

## 2020-02-05 DIAGNOSIS — K21.9 GASTROESOPHAGEAL REFLUX DISEASE, ESOPHAGITIS PRESENCE NOT SPECIFIED: ICD-10-CM

## 2020-02-05 PROCEDURE — 99214 OFFICE O/P EST MOD 30 MIN: CPT | Performed by: PHYSICIAN ASSISTANT

## 2020-02-05 NOTE — PATIENT INSTRUCTIONS
Continue treatment as directed, if symptoms develop/worsen call for appointment. Continue CPAP use.

## 2020-02-05 NOTE — PROGRESS NOTES
"YOB: 1975  Location: [facility]  Location Address: [address] [place] [address], [facility], [address] [place] [address]  Location Phone: [phone]    Chief Complaint   Patient presents with   • Sleep Apnea       History of Present Illness  Omid Jarquin is a 44 y.o. male.  Omid Jarquin is here for follow up of ENT complaints. The patient has had problems with snoring.  The symptoms are not localized to a particular location. The patient has had resolved symptoms. The symptoms have been resolved for the last several months. The symptoms are aggravated by  no identifiable factors. The symptoms are resolved by use of CPAP machine.      The patient has well controlled allergy symptoms on zyrtec and flonase and well controlled GERD on prilosec. The patient has no complaints at this time.    18 Sleep study:   AHI 58 primarily hypopnea  [place] scale 4  Neck Circum 16\"  BMI 36    19  [place] scale 3    2-5-20  [place] scale 2  BMI 33.6     Past Medical History:   Diagnosis Date   • Allergic rhinitis    • C. difficile colitis    • Chronic rhinitis    • Chronic sinusitis    • Deviated nasal septum    • Eustachian tube dysfunction    • GERD (gastroesophageal reflux disease)    • Herpes    • Hypertrophy of nasal turbinates    • LPRD (laryngopharyngeal reflux disease)    • ADRIAN (obstructive sleep apnea)    • Sensorineural hearing loss    • Shift work sleep disorder    • Snoring    • Tinnitus    • Venous insufficiency        Past Surgical History:   Procedure Laterality Date   • COLONOSCOPY  2007    Normal exam   • COLONOSCOPY  2002    [facility] chronic proctitis rectum   • COLONOSCOPY N/A 2018    Procedure: COLONOSCOPY WITH ANESTHESIA;  Surgeon: Fritz Chambers MD;  Location: [facility] ENDOSCOPY;  Service: Gastroenterology   • ENDOSCOPY AND COLONOSCOPY      Normal endo / Multiple aphthous ulcers in the rectum mild active proctitis   • HERNIA REPAIR      umbilical   • SEPTOPLASTY     • SINUS SURGERY  " 2000    [facility]   • TONSILLECTOMY  30yrs    [facility]       Outpatient Medications Marked as Taking for the 2/5/20 encounter (Office Visit) with Luis Peraza PA   Medication Sig Dispense Refill   • cetirizine (zyrTEC) 10 MG tablet Take 10 mg by mouth Daily.     • fluticasone (FLONASE) 50 MCG/ACT nasal spray 2 sprays into the nostril(s) as directed by provider Daily. Administer 2 sprays in each nostril for each dose. 1 bottle 5   • omeprazole (priLOSEC) 40 MG capsule Take 1 capsule by mouth Daily. 90 capsule 3       Augmentin [amoxicillin-pot clavulanate]; Avelox [moxifloxacin]; and Levaquin [levofloxacin in d5w]    Family History   Problem Relation Age of Onset   • No Known Problems Mother    • Cancer Father         skin   • Colon cancer Father    • Colon polyps Father        Social History     Socioeconomic History   • Marital status:      Spouse name: Not on file   • Number of children: Not on file   • Years of education: Not on file   • Highest education level: Not on file   Tobacco Use   • Smoking status: Never Smoker   • Smokeless tobacco: Never Used   Substance and Sexual Activity   • Alcohol use: Yes     Frequency: Monthly or less     Comment: occasional   • Drug use: No   • Sexual activity: Defer       Review of Systems   Constitutional: Negative.  Negative for activity change, appetite change, chills, diaphoresis, fatigue, fever and unexpected weight change.   HENT: Negative for congestion, ear discharge, ear pain, facial swelling, hearing loss, mouth sores, nosebleeds, postnasal drip, rhinorrhea, sinus pressure, sneezing, sore throat, tinnitus, trouble swallowing and voice change.    Eyes: Negative.  Negative for pain, discharge, redness, itching and visual disturbance.   Respiratory: Positive for apnea (snoring). Negative for cough, choking, chest tightness, shortness of breath, wheezing and stridor.    Cardiovascular: Negative.    Gastrointestinal: Negative.  Negative for nausea and  vomiting.        GERD   Endocrine: Negative.  Negative for cold intolerance and heat intolerance.   Genitourinary: Negative.    Musculoskeletal: Negative.  Negative for arthralgias, back pain, gait problem, neck pain and neck stiffness.   Skin: Negative.  Negative for rash.   Allergic/Immunologic: Positive for environmental allergies. Negative for food allergies.   Neurological: Negative.  Negative for dizziness, tremors, seizures, syncope, facial asymmetry, speech difficulty, weakness, light-headedness, numbness and headaches.   Hematological: Negative.  Negative for adenopathy. Does not bruise/bleed easily.   Psychiatric/Behavioral: Negative.  Negative for behavioral problems, sleep disturbance and suicidal ideas. The patient is not nervous/anxious and is not hyperactive.        Vitals:    02/05/20 1011   BP: 136/75   Pulse: (!) 167   Resp: 16   Temp: 97.6 °F (36.4 °C)       Body mass index is 33.57 kg/m².    Objective     Physical Exam  CONSTITUTIONAL: well nourished, overweight, alert, oriented, in no acute distress     COMMUNICATION AND VOICE: able to communicate normally, normal voice quality    HEAD: normocephalic, no lesions, atraumatic, no tenderness, no masses     FACE: appearance normal, no lesions, no tenderness, no deformities, facial motion symmetric    EYES: ocular motility normal, eyelids normal, orbits normal, no proptosis, conjunctiva normal , pupils equal, round     EARS:  Hearing: response to conversational voice normal bilaterally   External Ears: auricles without lesions  Otoscopic: tympanic membrane appearance normal, no lesions, no perforation, normal mobility, no fluid    NOSE:  External Nose: structure normal, no tenderness on palpation, no nasal discharge, no lesions, no evidence of trauma, nostrils patent   Intranasal Exam: nasal mucosa normal, vestibule within normal limits, inferior turbinate normal, nasal septum midline     ORAL:  Lips: upper and lower lips without lesion   Teeth:  dentition within normal limits for age   Gums: gingivae healthy   Oral Mucosa: oral mucosa normal, no mucosal lesions   Floor of Mouth: Warthin’s duct patent, mucosa normal  Tongue: lingual mucosa normal without lesions, normal tongue mobility   Palate: soft and hard palates with normal mucosa and structure  Oropharynx: oropharyngeal mucosa normal, malampati scale 2    NECK: neck appearance normal    CHEST/RESPIRATORY: respiratory effort normal, normal breath sounds     CARDIOVASCULAR: rate and rhythm normal, extremities without cyanosis or edema      NEUROLOGIC/PSYCHIATRIC: oriented to time, place and person, mood normal, affect appropriate, CN II-XII intact grossly    Assessment/Plan   Omid was seen today for sleep apnea.    Diagnoses and all orders for this visit:    Allergic rhinitis, unspecified seasonality, unspecified trigger    LPRD (laryngopharyngeal reflux disease)    Gastroesophageal reflux disease, esophagitis presence not specified    ADRIAN on CPAP      * Surgery not found *  No orders of the defined types were placed in this encounter.    Return if symptoms worsen or fail to improve.       Patient Instructions   Continue treatment as directed, if symptoms develop/worsen call for appointment. Continue CPAP use.

## 2020-02-14 RX ORDER — OMEPRAZOLE 40 MG/1
CAPSULE, DELAYED RELEASE ORAL
Qty: 90 CAPSULE | Refills: 4 | Status: SHIPPED | OUTPATIENT
Start: 2020-02-14 | End: 2021-05-10

## 2020-08-20 DIAGNOSIS — M79.89 SWELLING OF LEFT FOOT: ICD-10-CM

## 2020-08-20 DIAGNOSIS — M79.672 PAIN IN LEFT FOOT: Primary | ICD-10-CM

## 2020-08-21 ENCOUNTER — DOCUMENTATION (OUTPATIENT)
Dept: FAMILY MEDICINE CLINIC | Facility: CLINIC | Age: 45
End: 2020-08-21

## 2020-08-21 DIAGNOSIS — M79.89 SWELLING OF LEFT FOOT: ICD-10-CM

## 2020-08-21 DIAGNOSIS — S92.309A CLOSED NONDISPLACED FRACTURE OF METATARSAL BONE, UNSPECIFIED LATERALITY, UNSPECIFIED METATARSAL, INITIAL ENCOUNTER: Primary | ICD-10-CM

## 2020-08-21 DIAGNOSIS — M79.672 PAIN IN LEFT FOOT: ICD-10-CM

## 2020-12-23 ENCOUNTER — OFFICE VISIT (OUTPATIENT)
Dept: FAMILY MEDICINE CLINIC | Facility: CLINIC | Age: 45
End: 2020-12-23

## 2020-12-23 VITALS
OXYGEN SATURATION: 99 % | TEMPERATURE: 97.8 F | DIASTOLIC BLOOD PRESSURE: 74 MMHG | SYSTOLIC BLOOD PRESSURE: 138 MMHG | WEIGHT: 284 LBS | RESPIRATION RATE: 16 BRPM | HEIGHT: 76 IN | HEART RATE: 76 BPM | BODY MASS INDEX: 34.58 KG/M2

## 2020-12-23 DIAGNOSIS — Z00.00 ANNUAL PHYSICAL EXAM: Primary | ICD-10-CM

## 2020-12-23 PROCEDURE — 99396 PREV VISIT EST AGE 40-64: CPT | Performed by: FAMILY MEDICINE

## 2020-12-23 RX ORDER — CLARITHROMYCIN 500 MG/1
500 TABLET, COATED ORAL 2 TIMES DAILY
Qty: 20 TABLET | Refills: 0 | Status: SHIPPED | OUTPATIENT
Start: 2020-12-23 | End: 2021-01-10

## 2020-12-23 NOTE — PROGRESS NOTES
Subjective   Omid Jarquin is a 45 y.o. male.     Chief Complaint   Patient presents with   • Annual Exam       History of Present Illness     for several weeks --he notes sinus pain and pressure with occ nose bleeds--denies any fever or chills      Current Outpatient Medications:   •  cetirizine (zyrTEC) 10 MG tablet, Take 10 mg by mouth Daily., Disp: , Rfl:   •  fluticasone (FLONASE) 50 MCG/ACT nasal spray, 2 sprays into the nostril(s) as directed by provider Daily. Administer 2 sprays in each nostril for each dose., Disp: 1 bottle, Rfl: 5  •  omeprazole (priLOSEC) 40 MG capsule, TAKE 1 CAPSULE DAILY, Disp: 90 capsule, Rfl: 4  •  clarithromycin (Biaxin) 500 MG tablet, Take 1 tablet by mouth 2 (Two) Times a Day., Disp: 20 tablet, Rfl: 0  Allergies   Allergen Reactions   • Augmentin [Amoxicillin-Pot Clavulanate] Diarrhea   • Avelox [Moxifloxacin] Itching   • Levaquin [Levofloxacin In D5w] Nausea Only       Past Medical History:   Diagnosis Date   • Allergic rhinitis    • C. difficile colitis    • Chronic rhinitis    • Chronic sinusitis    • Deviated nasal septum    • Eustachian tube dysfunction    • GERD (gastroesophageal reflux disease)    • Herpes    • Hypertrophy of nasal turbinates    • LPRD (laryngopharyngeal reflux disease)    • ADRIAN (obstructive sleep apnea)    • Sensorineural hearing loss    • Shift work sleep disorder    • Snoring    • Tinnitus    • Venous insufficiency      Past Surgical History:   Procedure Laterality Date   • COLONOSCOPY  08/23/2007    Normal exam   • COLONOSCOPY  09/05/2002    [facility] chronic proctitis rectum   • COLONOSCOPY N/A 6/12/2018    Procedure: COLONOSCOPY WITH ANESTHESIA;  Surgeon: Fritz Chambers MD;  Location: [facility] ENDOSCOPY;  Service: Gastroenterology   • ENDOSCOPY AND COLONOSCOPY  2001    Normal endo / Multiple aphthous ulcers in the rectum mild active proctitis   • HERNIA REPAIR      umbilical   • SEPTOPLASTY     • SINUS SURGERY  2000    resser   • TONSILLECTOMY  30yrs     "clay       Review of Systems   Constitutional: Negative.    HENT: Positive for congestion, sinus pressure and sinus pain.    Eyes: Negative.    Respiratory: Negative.    Cardiovascular: Negative.    Gastrointestinal: Negative.    Endocrine: Negative.    Genitourinary: Negative.    Musculoskeletal: Negative.    Skin: Negative.    Allergic/Immunologic: Negative.    Neurological: Negative.    Hematological: Negative.    Psychiatric/Behavioral: Negative.        Objective  /74 (BP Location: Left arm)   Pulse 76   Temp 97.8 °F (36.6 °C) (Temporal)   Resp 16   Ht 193 cm (76\")   Wt 129 kg (284 lb)   SpO2 99%   BMI 34.57 kg/m²   Physical Exam  Vitals signs and nursing note reviewed.   Constitutional:       Appearance: Normal appearance.   HENT:      Head: Normocephalic and atraumatic.      Nose: Nose normal.      Mouth/Throat:      Mouth: Mucous membranes are moist.      Pharynx: Oropharynx is clear.   Eyes:      General:         Right eye: No discharge.      Extraocular Movements: Extraocular movements intact.      Conjunctiva/sclera: Conjunctivae normal.      Pupils: Pupils are equal, round, and reactive to light.   Neck:      Musculoskeletal: Normal range of motion and neck supple.   Cardiovascular:      Rate and Rhythm: Normal rate and regular rhythm.      Pulses: Normal pulses.      Heart sounds: Normal heart sounds.   Pulmonary:      Effort: Pulmonary effort is normal.      Breath sounds: Normal breath sounds.   Abdominal:      General: Abdomen is flat. Bowel sounds are normal.      Palpations: Abdomen is soft.   Musculoskeletal: Normal range of motion.   Skin:     General: Skin is warm and dry.      Capillary Refill: Capillary refill takes less than 2 seconds.   Neurological:      General: No focal deficit present.      Mental Status: He is alert and oriented to person, place, and time. Mental status is at baseline.   Psychiatric:         Mood and Affect: Mood normal.         Behavior: Behavior " normal.         Thought Content: Thought content normal.         Judgment: Judgment normal.         Assessment/Plan   Diagnoses and all orders for this visit:    1. Annual physical exam (Primary)  -     PSA DIAGNOSTIC  -     CBC w AUTO Differential  -     Comprehensive metabolic panel  -     Lipid Panel With / Chol / HDL Ratio  -     Urinalysis without microscopic (no culture) - Urine, Clean Catch    Other orders  -     clarithromycin (Biaxin) 500 MG tablet; Take 1 tablet by mouth 2 (Two) Times a Day.  Dispense: 20 tablet; Refill: 0                 Orders Placed This Encounter   Procedures   • PSA DIAGNOSTIC   • Comprehensive metabolic panel   • Lipid Panel With / Chol / HDL Ratio   • Urinalysis without microscopic (no culture) - Urine, Clean Catch   • CBC w AUTO Differential     Order Specific Question:   Manual Differential     Answer:   No       Follow up: 6 month(s)

## 2020-12-24 LAB
ALBUMIN SERPL-MCNC: 4.3 G/DL (ref 3.5–5.2)
ALBUMIN/GLOB SERPL: 2 G/DL
ALP SERPL-CCNC: 84 U/L (ref 39–117)
ALT SERPL-CCNC: 41 U/L (ref 1–41)
APPEARANCE UR: CLEAR
AST SERPL-CCNC: 21 U/L (ref 1–40)
BASOPHILS # BLD AUTO: 0.05 10*3/MM3 (ref 0–0.2)
BASOPHILS NFR BLD AUTO: 0.9 % (ref 0–1.5)
BILIRUB SERPL-MCNC: 0.5 MG/DL (ref 0–1.2)
BILIRUB UR QL STRIP: NEGATIVE
BUN SERPL-MCNC: 13 MG/DL (ref 6–20)
BUN/CREAT SERPL: 12.6 (ref 7–25)
CALCIUM SERPL-MCNC: 9.4 MG/DL (ref 8.6–10.5)
CHLORIDE SERPL-SCNC: 104 MMOL/L (ref 98–107)
CHOLEST SERPL-MCNC: 199 MG/DL (ref 0–200)
CHOLEST/HDLC SERPL: 4.23 {RATIO}
CO2 SERPL-SCNC: 26.4 MMOL/L (ref 22–29)
COLOR UR: YELLOW
CREAT SERPL-MCNC: 1.03 MG/DL (ref 0.76–1.27)
EOSINOPHIL # BLD AUTO: 0.1 10*3/MM3 (ref 0–0.4)
EOSINOPHIL NFR BLD AUTO: 1.9 % (ref 0.3–6.2)
ERYTHROCYTE [DISTWIDTH] IN BLOOD BY AUTOMATED COUNT: 13.8 % (ref 12.3–15.4)
GLOBULIN SER CALC-MCNC: 2.2 GM/DL
GLUCOSE SERPL-MCNC: 96 MG/DL (ref 65–99)
GLUCOSE UR QL: NEGATIVE
HCT VFR BLD AUTO: 41.7 % (ref 37.5–51)
HDLC SERPL-MCNC: 47 MG/DL (ref 40–60)
HGB BLD-MCNC: 14.1 G/DL (ref 13–17.7)
HGB UR QL STRIP: NEGATIVE
IMM GRANULOCYTES # BLD AUTO: 0.01 10*3/MM3 (ref 0–0.05)
IMM GRANULOCYTES NFR BLD AUTO: 0.2 % (ref 0–0.5)
KETONES UR QL STRIP: NEGATIVE
LDLC SERPL CALC-MCNC: 120 MG/DL (ref 0–100)
LEUKOCYTE ESTERASE UR QL STRIP: NEGATIVE
LYMPHOCYTES # BLD AUTO: 1.22 10*3/MM3 (ref 0.7–3.1)
LYMPHOCYTES NFR BLD AUTO: 22.8 % (ref 19.6–45.3)
MCH RBC QN AUTO: 28.2 PG (ref 26.6–33)
MCHC RBC AUTO-ENTMCNC: 33.8 G/DL (ref 31.5–35.7)
MCV RBC AUTO: 83.4 FL (ref 79–97)
MONOCYTES # BLD AUTO: 0.37 10*3/MM3 (ref 0.1–0.9)
MONOCYTES NFR BLD AUTO: 6.9 % (ref 5–12)
NEUTROPHILS # BLD AUTO: 3.61 10*3/MM3 (ref 1.7–7)
NEUTROPHILS NFR BLD AUTO: 67.3 % (ref 42.7–76)
NITRITE UR QL STRIP: NEGATIVE
NRBC BLD AUTO-RTO: 0 /100 WBC (ref 0–0.2)
PH UR STRIP: 6.5 [PH] (ref 5–8)
PLATELET # BLD AUTO: 235 10*3/MM3 (ref 140–450)
POTASSIUM SERPL-SCNC: 4.2 MMOL/L (ref 3.5–5.2)
PROT SERPL-MCNC: 6.5 G/DL (ref 6–8.5)
PROT UR QL STRIP: NEGATIVE
PSA SERPL-MCNC: 0.82 NG/ML (ref 0–4)
RBC # BLD AUTO: 5 10*6/MM3 (ref 4.14–5.8)
SODIUM SERPL-SCNC: 140 MMOL/L (ref 136–145)
SP GR UR: 1.02 (ref 1–1.03)
TRIGL SERPL-MCNC: 183 MG/DL (ref 0–150)
UROBILINOGEN UR STRIP-MCNC: NORMAL MG/DL
VLDLC SERPL CALC-MCNC: 32 MG/DL (ref 5–40)
WBC # BLD AUTO: 5.36 10*3/MM3 (ref 3.4–10.8)

## 2021-01-05 ENCOUNTER — TELEPHONE (OUTPATIENT)
Dept: FAMILY MEDICINE CLINIC | Facility: CLINIC | Age: 46
End: 2021-01-05

## 2021-01-05 NOTE — TELEPHONE ENCOUNTER
Pt notified of lab results and does not want to start cholesterol medication. He wants to try diet and exercise first.

## 2021-01-05 NOTE — TELEPHONE ENCOUNTER
PATIENT CALLED RETURNING CALL THAT HE RECEIVED LAST WEEK. HE WOULD LIKE TO HEAR ABOUT HIS TEST RESULTS FROM LAST APPOINTMENT.    PLEASE ADVISE: [phone]

## 2021-01-10 PROCEDURE — 87635 SARS-COV-2 COVID-19 AMP PRB: CPT | Performed by: NURSE PRACTITIONER

## 2021-01-18 ENCOUNTER — OFFICE VISIT (OUTPATIENT)
Dept: FAMILY MEDICINE CLINIC | Facility: CLINIC | Age: 46
End: 2021-01-18

## 2021-01-18 VITALS
HEART RATE: 91 BPM | SYSTOLIC BLOOD PRESSURE: 152 MMHG | DIASTOLIC BLOOD PRESSURE: 86 MMHG | RESPIRATION RATE: 18 BRPM | WEIGHT: 280 LBS | BODY MASS INDEX: 34.1 KG/M2 | OXYGEN SATURATION: 98 % | HEIGHT: 76 IN

## 2021-01-18 DIAGNOSIS — J01.40 ACUTE NON-RECURRENT PANSINUSITIS: Primary | ICD-10-CM

## 2021-01-18 PROCEDURE — 99213 OFFICE O/P EST LOW 20 MIN: CPT | Performed by: NURSE PRACTITIONER

## 2021-01-18 RX ORDER — CLARITHROMYCIN 500 MG/1
500 TABLET, COATED ORAL 2 TIMES DAILY
Qty: 20 TABLET | Refills: 0 | Status: SHIPPED | OUTPATIENT
Start: 2021-01-18 | End: 2021-03-31

## 2021-01-18 RX ORDER — PREDNISONE 20 MG/1
20 TABLET ORAL DAILY
Qty: 5 TABLET | Refills: 0 | Status: SHIPPED | OUTPATIENT
Start: 2021-01-18 | End: 2021-01-23

## 2021-01-19 NOTE — PROGRESS NOTES
Subjective   Chief Complaint:  Ear congestion, sinus pressure    History of Present Illness:  This 45 y.o. male was seen in the office today.  He was diagnosed with COVID-19 teen earlier this month.  The test we have on chart is dated 1/10/2021.  Reports has had ear congestion and sinus pressure worse over the last 2-3 days.    Allergies   Allergen Reactions   • Augmentin [Amoxicillin-Pot Clavulanate] Diarrhea   • Avelox [Moxifloxacin] Itching   • Levaquin [Levofloxacin In D5w] Nausea Only      Current Outpatient Medications on File Prior to Visit   Medication Sig   • cetirizine (zyrTEC) 10 MG tablet Take 10 mg by mouth Daily.   • omeprazole (priLOSEC) 40 MG capsule TAKE 1 CAPSULE DAILY     No current facility-administered medications on file prior to visit.       Past Medical, Surgical, Social, and Family History:  Past Medical History:   Diagnosis Date   • Allergic rhinitis    • C. difficile colitis    • Chronic rhinitis    • Chronic sinusitis    • Deviated nasal septum    • Eustachian tube dysfunction    • GERD (gastroesophageal reflux disease)    • Herpes    • Hypertrophy of nasal turbinates    • LPRD (laryngopharyngeal reflux disease)    • ADRIAN (obstructive sleep apnea)    • Sensorineural hearing loss    • Shift work sleep disorder    • Snoring    • Tinnitus    • Venous insufficiency      Past Surgical History:   Procedure Laterality Date   • COLONOSCOPY  08/23/2007    Normal exam   • COLONOSCOPY  09/05/2002    [facility] chronic proctitis rectum   • COLONOSCOPY N/A 6/12/2018    Procedure: COLONOSCOPY WITH ANESTHESIA;  Surgeon: Fritz Chambers MD;  Location: [facility] ENDOSCOPY;  Service: Gastroenterology   • ENDOSCOPY AND COLONOSCOPY  2001    Normal endo / Multiple aphthous ulcers in the rectum mild active proctitis   • HERNIA REPAIR      umbilical   • SEPTOPLASTY     • SINUS SURGERY  2000    [facility]   • TONSILLECTOMY  30yrs    clay     Social History     Socioeconomic History   • Marital status:      Spouse  "name: Not on file   • Number of children: Not on file   • Years of education: Not on file   • Highest education level: Not on file   Tobacco Use   • Smoking status: Never Smoker   • Smokeless tobacco: Never Used   Substance and Sexual Activity   • Alcohol use: Yes     Frequency: Monthly or less     Comment: occasional   • Drug use: No   • Sexual activity: Defer     Family History   Problem Relation Age of Onset   • No Known Problems Mother    • Cancer Father         skin   • Colon cancer Father    • Colon polyps Father      Objective   Physical Exam  Constitutional:       General: He is not in acute distress.  HENT:      Right Ear: Hearing normal. A middle ear effusion is present.      Left Ear: Hearing normal. A middle ear effusion is present.      Nose:      Right Sinus: Maxillary sinus tenderness and frontal sinus tenderness present.      Left Sinus: Maxillary sinus tenderness and frontal sinus tenderness present.   Cardiovascular:      Rate and Rhythm: Normal rate and regular rhythm.   Pulmonary:      Effort: Pulmonary effort is normal.      Breath sounds: Normal breath sounds.   Neurological:      Mental Status: He is alert.     /86   Pulse 91   Resp 18   Ht 193 cm (76\")   Wt 127 kg (280 lb)   SpO2 98%   BMI 34.08 kg/m²     Assessment/Plan   Diagnoses and all orders for this visit:    1. Acute non-recurrent pansinusitis (Primary)  -     clarithromycin (Biaxin) 500 MG tablet; Take 1 tablet by mouth 2 (Two) Times a Day.  Dispense: 20 tablet; Refill: 0  -     predniSONE (DELTASONE) 20 MG tablet; Take 1 tablet by mouth Daily for 5 days.  Dispense: 5 tablet; Refill: 0    Discussion:  Advised and educated plan of care.  Antibiotics and steroids for sinusitis.    Follow-up:  Return if symptoms worsen or fail to improve.    Note e-Signed by ZACARIAS Teresa on 01/18/2021 at 08:11 CST  "

## 2021-03-31 ENCOUNTER — HOSPITAL ENCOUNTER (EMERGENCY)
Facility: HOSPITAL | Age: 46
Discharge: HOME | End: 2021-03-31
Attending: EMERGENCY MEDICINE | Admitting: EMERGENCY MEDICINE

## 2021-03-31 ENCOUNTER — APPOINTMENT (OUTPATIENT)
Dept: GENERAL RADIOLOGY | Facility: HOSPITAL | Age: 46
End: 2021-03-31

## 2021-03-31 VITALS
SYSTOLIC BLOOD PRESSURE: 130 MMHG | OXYGEN SATURATION: 98 % | HEART RATE: 67 BPM | WEIGHT: 280 LBS | DIASTOLIC BLOOD PRESSURE: 79 MMHG | RESPIRATION RATE: 20 BRPM | TEMPERATURE: 97.5 F | BODY MASS INDEX: 34.1 KG/M2 | HEIGHT: 76 IN

## 2021-03-31 DIAGNOSIS — K44.9 HIATAL HERNIA: ICD-10-CM

## 2021-03-31 DIAGNOSIS — R07.9 CHEST PAIN, UNSPECIFIED TYPE: Primary | ICD-10-CM

## 2021-03-31 LAB
ALBUMIN SERPL-MCNC: 4.2 G/DL (ref 3.5–5.2)
ALBUMIN/GLOB SERPL: 1.8 G/DL
ALP SERPL-CCNC: 71 U/L (ref 39–117)
ALT SERPL W P-5'-P-CCNC: 35 U/L (ref 1–41)
ANION GAP SERPL CALCULATED.3IONS-SCNC: 13 MMOL/L (ref 5–15)
AST SERPL-CCNC: 23 U/L (ref 1–40)
BASOPHILS # BLD AUTO: 0.04 10*3/MM3 (ref 0–0.2)
BASOPHILS NFR BLD AUTO: 0.7 % (ref 0–1.5)
BILIRUB SERPL-MCNC: 0.2 MG/DL (ref 0–1.2)
BUN SERPL-MCNC: 16 MG/DL (ref 6–20)
BUN/CREAT SERPL: 15.7 (ref 7–25)
CALCIUM SPEC-SCNC: 9.1 MG/DL (ref 8.6–10.5)
CHLORIDE SERPL-SCNC: 105 MMOL/L (ref 98–107)
CO2 SERPL-SCNC: 23 MMOL/L (ref 22–29)
CREAT SERPL-MCNC: 1.02 MG/DL (ref 0.76–1.27)
D DIMER PPP FEU-MCNC: 0.25 MG/L (FEU) (ref 0–0.5)
DEPRECATED RDW RBC AUTO: 41.2 FL (ref 37–54)
EOSINOPHIL # BLD AUTO: 0.09 10*3/MM3 (ref 0–0.4)
EOSINOPHIL NFR BLD AUTO: 1.5 % (ref 0.3–6.2)
ERYTHROCYTE [DISTWIDTH] IN BLOOD BY AUTOMATED COUNT: 14 % (ref 12.3–15.4)
GFR SERPL CREATININE-BSD FRML MDRD: 78 ML/MIN/1.73
GLOBULIN UR ELPH-MCNC: 2.4 GM/DL
GLUCOSE SERPL-MCNC: 144 MG/DL (ref 65–99)
HCT VFR BLD AUTO: 39.8 % (ref 37.5–51)
HGB BLD-MCNC: 13.4 G/DL (ref 13–17.7)
HOLD SPECIMEN: NORMAL
HOLD SPECIMEN: NORMAL
IMM GRANULOCYTES # BLD AUTO: 0.02 10*3/MM3 (ref 0–0.05)
IMM GRANULOCYTES NFR BLD AUTO: 0.3 % (ref 0–0.5)
LYMPHOCYTES # BLD AUTO: 1.29 10*3/MM3 (ref 0.7–3.1)
LYMPHOCYTES NFR BLD AUTO: 21.9 % (ref 19.6–45.3)
MAGNESIUM SERPL-MCNC: 2.1 MG/DL (ref 1.6–2.6)
MCH RBC QN AUTO: 27.5 PG (ref 26.6–33)
MCHC RBC AUTO-ENTMCNC: 33.7 G/DL (ref 31.5–35.7)
MCV RBC AUTO: 81.7 FL (ref 79–97)
MONOCYTES # BLD AUTO: 0.45 10*3/MM3 (ref 0.1–0.9)
MONOCYTES NFR BLD AUTO: 7.7 % (ref 5–12)
NEUTROPHILS NFR BLD AUTO: 3.99 10*3/MM3 (ref 1.7–7)
NEUTROPHILS NFR BLD AUTO: 67.9 % (ref 42.7–76)
NRBC BLD AUTO-RTO: 0 /100 WBC (ref 0–0.2)
NT-PROBNP SERPL-MCNC: 20.3 PG/ML (ref 0–450)
PLATELET # BLD AUTO: 207 10*3/MM3 (ref 140–450)
PMV BLD AUTO: 9.7 FL (ref 6–12)
POTASSIUM SERPL-SCNC: 3.7 MMOL/L (ref 3.5–5.2)
PROT SERPL-MCNC: 6.6 G/DL (ref 6–8.5)
RBC # BLD AUTO: 4.87 10*6/MM3 (ref 4.14–5.8)
SODIUM SERPL-SCNC: 141 MMOL/L (ref 136–145)
TROPONIN T SERPL-MCNC: <0.01 NG/ML (ref 0–0.03)
TROPONIN T SERPL-MCNC: <0.01 NG/ML (ref 0–0.03)
WBC # BLD AUTO: 5.88 10*3/MM3 (ref 3.4–10.8)
WHOLE BLOOD HOLD SPECIMEN: NORMAL
WHOLE BLOOD HOLD SPECIMEN: NORMAL

## 2021-03-31 PROCEDURE — 85379 FIBRIN DEGRADATION QUANT: CPT | Performed by: EMERGENCY MEDICINE

## 2021-03-31 PROCEDURE — 93010 ELECTROCARDIOGRAM REPORT: CPT | Performed by: INTERNAL MEDICINE

## 2021-03-31 PROCEDURE — 99285 EMERGENCY DEPT VISIT HI MDM: CPT

## 2021-03-31 PROCEDURE — 93005 ELECTROCARDIOGRAM TRACING: CPT

## 2021-03-31 PROCEDURE — 84484 ASSAY OF TROPONIN QUANT: CPT | Performed by: EMERGENCY MEDICINE

## 2021-03-31 PROCEDURE — 80053 COMPREHEN METABOLIC PANEL: CPT | Performed by: EMERGENCY MEDICINE

## 2021-03-31 PROCEDURE — 83735 ASSAY OF MAGNESIUM: CPT | Performed by: EMERGENCY MEDICINE

## 2021-03-31 PROCEDURE — 85025 COMPLETE CBC W/AUTO DIFF WBC: CPT | Performed by: EMERGENCY MEDICINE

## 2021-03-31 PROCEDURE — 83880 ASSAY OF NATRIURETIC PEPTIDE: CPT | Performed by: EMERGENCY MEDICINE

## 2021-03-31 PROCEDURE — 93005 ELECTROCARDIOGRAM TRACING: CPT | Performed by: EMERGENCY MEDICINE

## 2021-03-31 PROCEDURE — 36415 COLL VENOUS BLD VENIPUNCTURE: CPT

## 2021-03-31 PROCEDURE — 71045 X-RAY EXAM CHEST 1 VIEW: CPT

## 2021-03-31 RX ORDER — SODIUM CHLORIDE 0.9 % (FLUSH) 0.9 %
10 SYRINGE (ML) INJECTION AS NEEDED
Status: DISCONTINUED | OUTPATIENT
Start: 2021-03-31 | End: 2021-04-01 | Stop reason: HOSPADM

## 2021-03-31 RX ORDER — FLUTICASONE PROPIONATE 50 MCG
2 SPRAY, SUSPENSION (ML) NASAL DAILY
COMMUNITY
End: 2021-08-22

## 2021-03-31 RX ORDER — ASPIRIN 81 MG/1
324 TABLET, CHEWABLE ORAL ONCE
Status: COMPLETED | OUTPATIENT
Start: 2021-03-31 | End: 2021-03-31

## 2021-03-31 RX ADMIN — ASPIRIN 324 MG: 81 TABLET, CHEWABLE ORAL at 20:11

## 2021-04-01 LAB
QT INTERVAL: 352 MS
QT INTERVAL: 376 MS
QTC INTERVAL: 397 MS
QTC INTERVAL: 440 MS

## 2021-04-01 NOTE — ED PROVIDER NOTES
"Subjective   Patient presents with a complaint of several days of \"just not feeling right\".  He knows something is wrong.  He said he has noticed some tightness in his chest this seems to make him also feel little short of breath.  He does not still related to exertion or any activity.  It was worse tonight when he was just sitting at home.  He also at times is felt like he could not get words out properly.  He denies any headache or blurred vision.  He sometimes feels short of breath with this tightness in his chest.  He is worried about his heart because there is a strong family history of heart disease.      History provided by:  Patient   used: No    Chest Pain  Pain location:  L chest  Pain quality: tightness    Pain radiates to:  Does not radiate  Pain severity:  Moderate  Onset quality:  Gradual  Duration:  5 days  Timing:  Intermittent  Progression:  Waxing and waning  Chronicity:  New  Context: not breathing, not drug use, not eating, not intercourse, not lifting, not movement, not raising an arm, not at rest, not stress and not trauma    Relieved by:  Nothing  Worsened by:  Nothing  Ineffective treatments:  None tried  Associated symptoms: altered mental status and shortness of breath    Associated symptoms: no abdominal pain, no AICD problem, no anorexia, no anxiety, no back pain, no claudication, no cough, no diaphoresis, no dizziness, no dysphagia, no fatigue, no fever, no headache, no heartburn, no lower extremity edema, no nausea, no near-syncope, no numbness, no orthopnea, no palpitations, no PND, no syncope, no vomiting and no weakness    Risk factors: high cholesterol and male sex    Risk factors: no aortic disease, no birth control, no coronary artery disease, no diabetes mellitus, no Breonna-Danlos syndrome, no hypertension, no immobilization, no Marfan's syndrome, not obese, not pregnant, no prior DVT/PE, no smoking and no surgery        Review of Systems   Constitutional: " Negative.  Negative for diaphoresis, fatigue and fever.   HENT: Negative.  Negative for trouble swallowing.    Respiratory: Positive for shortness of breath. Negative for cough.    Cardiovascular: Positive for chest pain. Negative for palpitations, orthopnea, claudication, syncope, PND and near-syncope.   Gastrointestinal: Negative.  Negative for abdominal pain, anorexia, heartburn, nausea and vomiting.   Genitourinary: Negative.    Musculoskeletal: Negative.  Negative for back pain.   Skin: Negative.    Neurological: Negative.  Negative for dizziness, weakness, numbness and headaches.   Hematological: Negative.    All other systems reviewed and are negative.      Past Medical History:   Diagnosis Date   • Allergic rhinitis    • C. difficile colitis    • Chronic rhinitis    • Chronic sinusitis    • Deviated nasal septum    • Eustachian tube dysfunction    • GERD (gastroesophageal reflux disease)    • Herpes    • Hypertrophy of nasal turbinates    • LPRD (laryngopharyngeal reflux disease)    • ADRIAN (obstructive sleep apnea)    • Sensorineural hearing loss    • Shift work sleep disorder    • Snoring    • Tinnitus    • Venous insufficiency        Allergies   Allergen Reactions   • Augmentin [Amoxicillin-Pot Clavulanate] Diarrhea   • Avelox [Moxifloxacin] Itching   • Levaquin [Levofloxacin In D5w] Nausea Only       Past Surgical History:   Procedure Laterality Date   • COLONOSCOPY  08/23/2007    Normal exam   • COLONOSCOPY  09/05/2002    [facility] chronic proctitis rectum   • COLONOSCOPY N/A 6/12/2018    Procedure: COLONOSCOPY WITH ANESTHESIA;  Surgeon: Fritz Chambers MD;  Location: [facility] ENDOSCOPY;  Service: Gastroenterology   • ENDOSCOPY AND COLONOSCOPY  2001    Normal endo / Multiple aphthous ulcers in the rectum mild active proctitis   • HERNIA REPAIR      umbilical   • SEPTOPLASTY     • SINUS SURGERY  2000    [facility]   • TONSILLECTOMY  30yrs    clay       Family History   Problem Relation Age of Onset   • No Known  Problems Mother    • Cancer Father         skin   • Colon cancer Father    • Colon polyps Father        Social History     Socioeconomic History   • Marital status:      Spouse name: Not on file   • Number of children: Not on file   • Years of education: Not on file   • Highest education level: Not on file   Tobacco Use   • Smoking status: Never Smoker   • Smokeless tobacco: Never Used   Vaping Use   • Vaping Use: Never used   Substance and Sexual Activity   • Alcohol use: Yes     Comment: occasional   • Drug use: No   • Sexual activity: Defer       Prior to Admission medications    Medication Sig Start Date End Date Taking? Authorizing Provider   cetirizine (zyrTEC) 10 MG tablet Take 10 mg by mouth Daily.   Yes ProviderMeli MD   fluticasone (FLONASE) 50 MCG/ACT nasal spray 2 sprays into the nostril(s) as directed by provider Daily.   Yes ProviderMeli MD   omeprazole (priLOSEC) 40 MG capsule TAKE 1 CAPSULE DAILY 2/14/20  Yes Sixto Leung MD   clarithromycin (Biaxin) 500 MG tablet Take 1 tablet by mouth 2 (Two) Times a Day. 1/18/21   Chino Newell APRN       Medications   sodium chloride 0.9 % flush 10 mL (has no administration in time range)   aspirin chewable tablet 324 mg (324 mg Oral Given 3/31/21 2011)       Vitals:    03/31/21 2145   BP: 151/95   Pulse: 72   Resp:    Temp:    SpO2: 97%         Objective   Physical Exam  Vitals and nursing note reviewed.   Constitutional:       Appearance: He is well-developed.   HENT:      Head: Normocephalic and atraumatic.   Cardiovascular:      Rate and Rhythm: Normal rate and regular rhythm.   Pulmonary:      Effort: Pulmonary effort is normal.      Breath sounds: Normal breath sounds.   Abdominal:      General: Bowel sounds are normal.      Palpations: Abdomen is soft.   Musculoskeletal:         General: Normal range of motion.      Cervical back: Normal range of motion and neck supple.   Skin:     General: Skin is warm and dry.    Neurological:      General: No focal deficit present.      Mental Status: He is alert and oriented to person, place, and time.   Psychiatric:         Mood and Affect: Mood normal.         Behavior: Behavior normal.         Procedures         Lab Results (last 24 hours)     Procedure Component Value Units Date/Time    CBC & Differential [[id number]]  (Normal) Collected: 03/31/21 1958    Specimen: Blood Updated: 03/31/21 2030    Narrative:      The following orders were created for panel order CBC & Differential.  Procedure                               Abnormality         Status                     ---------                               -----------         ------                     CBC Auto Differential[[id number]]        Normal              Final result                 Please view results for these tests on the individual orders.    Comprehensive Metabolic Panel [[id number]]  (Abnormal) Collected: 03/31/21 1958    Specimen: Blood Updated: 03/31/21 2053     Glucose 144 mg/dL      BUN 16 mg/dL      Creatinine 1.02 mg/dL      Sodium 141 mmol/L      Potassium 3.7 mmol/L      Chloride 105 mmol/L      CO2 23.0 mmol/L      Calcium 9.1 mg/dL      Total Protein 6.6 g/dL      Albumin 4.20 g/dL      ALT (SGPT) 35 U/L      AST (SGOT) 23 U/L      Alkaline Phosphatase 71 U/L      Total Bilirubin 0.2 mg/dL      eGFR Non African Amer 78 mL/min/1.73      Globulin 2.4 gm/dL      A/G Ratio 1.8 g/dL      BUN/Creatinine Ratio 15.7     Anion Gap 13.0 mmol/L     Narrative:      GFR Normal >60  Chronic Kidney Disease <60  Kidney Failure <15      Troponin [[id number]]  (Normal) Collected: 03/31/21 1958    Specimen: Blood Updated: 03/31/21 2048     Troponin T <0.010 ng/mL     Narrative:      Troponin T Reference Range:  <= 0.03 ng/mL-   Negative for AMI  >0.03 ng/mL-     Abnormal for myocardial necrosis.  Clinicians would have to utilize clinical acumen, EKG, Troponin and serial changes to determine if it is an Acute Myocardial Infarction or  myocardial injury due to an underlying chronic condition.       Results may be falsely decreased if patient taking Biotin.      CBC Auto Differential [[id number]]  (Normal) Collected: 03/31/21 1958    Specimen: Blood Updated: 03/31/21 2030     WBC 5.88 10*3/mm3      RBC 4.87 10*6/mm3      Hemoglobin 13.4 g/dL      Hematocrit 39.8 %      MCV 81.7 fL      MCH 27.5 pg      MCHC 33.7 g/dL      RDW 14.0 %      RDW-SD 41.2 fl      MPV 9.7 fL      Platelets 207 10*3/mm3      Neutrophil % 67.9 %      Lymphocyte % 21.9 %      Monocyte % 7.7 %      Eosinophil % 1.5 %      Basophil % 0.7 %      Immature Grans % 0.3 %      Neutrophils, Absolute 3.99 10*3/mm3      Lymphocytes, Absolute 1.29 10*3/mm3      Monocytes, Absolute 0.45 10*3/mm3      Eosinophils, Absolute 0.09 10*3/mm3      Basophils, Absolute 0.04 10*3/mm3      Immature Grans, Absolute 0.02 10*3/mm3      nRBC 0.0 /100 WBC     D-dimer, Quantitative [[id number]]  (Normal) Collected: 03/31/21 1958    Specimen: Blood Updated: 03/31/21 2039     D-Dimer, Quantitative 0.25 mg/L (FEU)     Narrative:      Reference Range is 0-0.50 mg/L FEU. However, results <0.50 mg/L FEU tends to rule out DVT or PE. Results >0.50 mg/L FEU are not useful in predicting absence or presence of DVT or PE.      BNP [[id number]]  (Normal) Collected: 03/31/21 1958    Specimen: Blood Updated: 03/31/21 2047     proBNP 20.3 pg/mL     Narrative:      Among patients with dyspnea, NT-proBNP is highly sensitive for the detection of acute congestive heart failure. In addition NT-proBNP of <300 pg/ml effectively rules out acute congestive heart failure with 99% negative predictive value.    Results may be falsely decreased if patient taking Biotin.      Magnesium [[id number]]  (Normal) Collected: 03/31/21 1958    Specimen: Blood Updated: 03/31/21 2047     Magnesium 2.1 mg/dL     Troponin [[id number]]  (Normal) Collected: 03/31/21 2217    Specimen: Blood Updated: 03/31/21 2244     Troponin T <0.010 ng/mL      Narrative:      Troponin T Reference Range:  <= 0.03 ng/mL-   Negative for AMI  >0.03 ng/mL-     Abnormal for myocardial necrosis.  Clinicians would have to utilize clinical acumen, EKG, Troponin and serial changes to determine if it is an Acute Myocardial Infarction or myocardial injury due to an underlying chronic condition.       Results may be falsely decreased if patient taking Biotin.            XR Chest 1 View   Final Result   1. Elevation of the right hemidiaphragm and large hiatal hernia, with   what appears to be atelectasis in the right lung base. Difficult to   exclude pneumonia in the right base.           This report was finalized on 03/31/2021 20:25 by Dr Víctor Kilgore, .          ED Course  ED Course as of Mar 31 2311   Wed Mar 31, 2021   2310 Told the patient his testing here for cardiac wise is all negative.  He does have a hiatal hernia noted on chest x-ray that he did not know he had.  This is the probable source of his pain but I cannot entirely rule out heart problems.  I did offer to put him in the hospital and get a stress test tomorrow but he felt like he can do it as an outpatient later on and I think it is reasonable.  We did set him up for that.  He is discharged in stable condition.    [TR]      ED Course User Index  [TR] Sixto Montes Jr., MD         HEART Score (for prediction of 6-week risk of major adverse cardiac event) reviewed and/or performed as part of the patient evaluation and treatment planning process.  The result associated with this review/performance is: 3      MDM  Number of Diagnoses or Management Options  Chest pain, unspecified type: new and requires workup  Hiatal hernia: new and requires workup     Amount and/or Complexity of Data Reviewed  Clinical lab tests: reviewed and ordered  Tests in the radiology section of CPT®: ordered and reviewed  Tests in the medicine section of CPT®: ordered and reviewed    Risk of Complications, Morbidity, and/or  Mortality  Presenting problems: moderate  Diagnostic procedures: moderate  Management options: moderate    Patient Progress  Patient progress: stable      Final diagnoses:   Chest pain, unspecified type   Hiatal hernia          Sixto Montes Jr., MD  03/31/21 9919

## 2021-04-05 ENCOUNTER — TELEPHONE (OUTPATIENT)
Dept: FAMILY MEDICINE CLINIC | Facility: CLINIC | Age: 46
End: 2021-04-05

## 2021-04-05 NOTE — TELEPHONE ENCOUNTER
Tell him to get the stress tst--I find it hard to belive he has pneumonia without a high white count or fever--then lets talk

## 2021-04-05 NOTE — TELEPHONE ENCOUNTER
----- Message from Kimberlee Reed MA sent at 4/5/2021  8:47 AM CDT -----  Regarding: FW: Test Results Question  Contact: [phone]    ----- Message -----  From: Omid Jarquin  Sent: 4/4/2021   6:13 PM CDT  To: [facility]  Subject: Test Results Question                            Dr. Leung,    I went to [facility] ER last Wed. night.  I had not been feeling quite right for about a week.  I had tightness in my upper left chest and was getting out of breath quickly.  Sixto Chaidez was the ER doc that night.  I had a chest xray and bloodwork and ekg.  He said the tests for my heart were all normal.  He told me I have a large hiatal hernia.  When I looked at MY Chart, it said I had atelectasis in the right lung and possibly pneumonia.  DR CHAIDEZ NEVER MENTIONED THIS TO ME.  I also noticed my sugar was high.  They were supposed to call me about a stress test but I never heard from anyone. I still get short of breath quickly but my chest is not as tight  Please look over all the test results and tell me what I need to do.    Omid , go ahead and get your stress test(call the ed if you havent heard from anyone) and then see me the next day

## 2021-04-08 ENCOUNTER — HOSPITAL ENCOUNTER (OUTPATIENT)
Dept: CARDIOLOGY | Facility: HOSPITAL | Age: 46
Discharge: HOME | End: 2021-04-08
Admitting: EMERGENCY MEDICINE

## 2021-04-08 VITALS
BODY MASS INDEX: 34.09 KG/M2 | HEIGHT: 76 IN | HEART RATE: 83 BPM | SYSTOLIC BLOOD PRESSURE: 154 MMHG | DIASTOLIC BLOOD PRESSURE: 96 MMHG | WEIGHT: 279.98 LBS

## 2021-04-08 DIAGNOSIS — R07.9 CHEST PAIN, UNSPECIFIED TYPE: ICD-10-CM

## 2021-04-08 LAB
BH CV STRESS BP STAGE 1: NORMAL
BH CV STRESS BP STAGE 2: NORMAL
BH CV STRESS DURATION MIN STAGE 1: 3
BH CV STRESS DURATION MIN STAGE 2: 3
BH CV STRESS DURATION SEC STAGE 1: 0
BH CV STRESS DURATION SEC STAGE 2: 0
BH CV STRESS GRADE STAGE 1: 10
BH CV STRESS GRADE STAGE 2: 12
BH CV STRESS HR STAGE 1: 125
BH CV STRESS HR STAGE 2: 151
BH CV STRESS METS STAGE 1: 5
BH CV STRESS METS STAGE 2: 7.5
BH CV STRESS PROTOCOL 1: NORMAL
BH CV STRESS RECOVERY BP: NORMAL MMHG
BH CV STRESS RECOVERY HR: 85 BPM
BH CV STRESS SPEED STAGE 1: 1.7
BH CV STRESS SPEED STAGE 2: 2.5
BH CV STRESS STAGE 1: 1
BH CV STRESS STAGE 2: 2
MAXIMAL PREDICTED HEART RATE: 173 BPM
PERCENT MAX PREDICTED HR: 87.28 %
STRESS BASELINE BP: NORMAL MMHG
STRESS BASELINE HR: 80 BPM
STRESS PERCENT HR: 103 %
STRESS POST ESTIMATED WORKLOAD: 10 METS
STRESS POST EXERCISE DUR MIN: 6 MIN
STRESS POST EXERCISE DUR SEC: 0 SEC
STRESS POST PEAK BP: NORMAL MMHG
STRESS POST PEAK HR: 151 BPM
STRESS TARGET HR: 147 BPM

## 2021-04-08 PROCEDURE — 93350 STRESS TTE ONLY: CPT

## 2021-04-08 PROCEDURE — 93320 DOPPLER ECHO COMPLETE: CPT

## 2021-04-08 PROCEDURE — 93350 STRESS TTE ONLY: CPT | Performed by: INTERNAL MEDICINE

## 2021-04-08 PROCEDURE — 93325 DOPPLER ECHO COLOR FLOW MAPG: CPT

## 2021-04-08 PROCEDURE — 93018 CV STRESS TEST I&R ONLY: CPT | Performed by: INTERNAL MEDICINE

## 2021-04-08 PROCEDURE — 93017 CV STRESS TEST TRACING ONLY: CPT

## 2021-04-08 PROCEDURE — 93352 ADMIN ECG CONTRAST AGENT: CPT | Performed by: INTERNAL MEDICINE

## 2021-04-08 PROCEDURE — 25010000002 PERFLUTREN 6.52 MG/ML SUSPENSION: Performed by: INTERNAL MEDICINE

## 2021-04-08 RX ADMIN — PERFLUTREN 8.48 MG: 6.52 INJECTION, SUSPENSION INTRAVENOUS at 13:43

## 2021-04-09 ENCOUNTER — TELEPHONE (OUTPATIENT)
Dept: EMERGENCY DEPT | Facility: HOSPITAL | Age: 46
End: 2021-04-09

## 2021-04-09 NOTE — TELEPHONE ENCOUNTER
----- Message from ZACARIAS Maharaj sent at 4/8/2021  3:54 PM CDT -----  Please follow up with PCP. Thanks

## 2021-05-10 RX ORDER — OMEPRAZOLE 40 MG/1
CAPSULE, DELAYED RELEASE ORAL
Qty: 90 CAPSULE | Refills: 0 | Status: SHIPPED | OUTPATIENT
Start: 2021-05-10 | End: 2021-08-09

## 2021-05-10 NOTE — TELEPHONE ENCOUNTER
Requested Prescriptions     Pending Prescriptions Disp Refills   • omeprazole (priLOSEC) 40 MG capsule [Pharmacy Med Name: OMEPRAZOLE DR CAPS 40MG] 90 capsule 0     Sig: TAKE 1 CAPSULE DAILY

## 2021-06-22 ENCOUNTER — OFFICE VISIT (OUTPATIENT)
Dept: FAMILY MEDICINE CLINIC | Facility: CLINIC | Age: 46
End: 2021-06-22

## 2021-06-22 VITALS
HEIGHT: 76 IN | SYSTOLIC BLOOD PRESSURE: 136 MMHG | RESPIRATION RATE: 16 BRPM | BODY MASS INDEX: 33.73 KG/M2 | HEART RATE: 70 BPM | DIASTOLIC BLOOD PRESSURE: 72 MMHG | WEIGHT: 277 LBS | TEMPERATURE: 98.5 F

## 2021-06-22 DIAGNOSIS — E78.2 MIXED HYPERLIPIDEMIA: ICD-10-CM

## 2021-06-22 DIAGNOSIS — K21.9 GASTROESOPHAGEAL REFLUX DISEASE, UNSPECIFIED WHETHER ESOPHAGITIS PRESENT: Primary | ICD-10-CM

## 2021-06-22 DIAGNOSIS — J30.9 ALLERGIC RHINITIS, UNSPECIFIED SEASONALITY, UNSPECIFIED TRIGGER: ICD-10-CM

## 2021-06-22 PROCEDURE — 99396 PREV VISIT EST AGE 40-64: CPT | Performed by: FAMILY MEDICINE

## 2021-06-22 NOTE — PROGRESS NOTES
"Subjective   Omid Jarquin is a 46 y.o. male.     CC:\"im here for recheck of my chol\"    History of Present Illness     his rhinitis symptoms are stable and his gerd symptoms are stable with meds--he also woudld like to get chol rechecvked      Current Outpatient Medications:   •  cetirizine (zyrTEC) 10 MG tablet, Take 10 mg by mouth Daily., Disp: , Rfl:   •  omeprazole (priLOSEC) 40 MG capsule, TAKE 1 CAPSULE DAILY, Disp: 90 capsule, Rfl: 0  •  fluticasone (FLONASE) 50 MCG/ACT nasal spray, 2 sprays into the nostril(s) as directed by provider Daily., Disp: , Rfl:   Allergies   Allergen Reactions   • Augmentin [Amoxicillin-Pot Clavulanate] Diarrhea   • Avelox [Moxifloxacin] Itching   • Levaquin [Levofloxacin In D5w] Nausea Only       Past Medical History:   Diagnosis Date   • Allergic rhinitis    • C. difficile colitis    • Chronic rhinitis    • Chronic sinusitis    • Deviated nasal septum    • Eustachian tube dysfunction    • GERD (gastroesophageal reflux disease)    • Herpes    • Hypertrophy of nasal turbinates    • LPRD (laryngopharyngeal reflux disease)    • ADRIAN (obstructive sleep apnea)    • Sensorineural hearing loss    • Shift work sleep disorder    • Snoring    • Tinnitus    • Venous insufficiency      Past Surgical History:   Procedure Laterality Date   • COLONOSCOPY  08/23/2007    Normal exam   • COLONOSCOPY  09/05/2002    [facility] chronic proctitis rectum   • COLONOSCOPY N/A 6/12/2018    Procedure: COLONOSCOPY WITH ANESTHESIA;  Surgeon: Fritz Chambers MD;  Location: [facility] ENDOSCOPY;  Service: Gastroenterology   • ENDOSCOPY AND COLONOSCOPY  2001    Normal endo / Multiple aphthous ulcers in the rectum mild active proctitis   • HERNIA REPAIR      umbilical   • SEPTOPLASTY     • SINUS SURGERY  2000    [facility]   • TONSILLECTOMY  30yrs    clay       Review of Systems   Constitutional: Negative.    HENT: Positive for rhinorrhea.    Eyes: Negative.    Respiratory: Negative.    Cardiovascular: Negative.  " "  Gastrointestinal: Negative.    Endocrine: Negative.    Genitourinary: Negative.    Musculoskeletal: Negative.    Allergic/Immunologic: Negative.    Neurological: Negative.    Hematological: Negative.    Psychiatric/Behavioral: Negative.        Objective  /72   Pulse 70   Temp 98.5 °F (36.9 °C)   Resp 16   Ht 193 cm (75.98\")   Wt 126 kg (277 lb)   BMI 33.73 kg/m²   Physical Exam  Vitals and nursing note reviewed.   Constitutional:       Appearance: He is normal weight.   HENT:      Head: Normocephalic and atraumatic.      Nose: Nose normal.      Mouth/Throat:      Mouth: Mucous membranes are moist.      Pharynx: Oropharynx is clear.   Eyes:      Extraocular Movements: Extraocular movements intact.      Conjunctiva/sclera: Conjunctivae normal.      Pupils: Pupils are equal, round, and reactive to light.   Cardiovascular:      Rate and Rhythm: Normal rate and regular rhythm.      Pulses: Normal pulses.      Heart sounds: Normal heart sounds.   Pulmonary:      Effort: Pulmonary effort is normal.      Breath sounds: Normal breath sounds.   Abdominal:      General: Abdomen is flat. Bowel sounds are normal.      Palpations: Abdomen is soft.   Musculoskeletal:         General: Normal range of motion.      Cervical back: Normal range of motion and neck supple.   Skin:     General: Skin is warm and dry.      Capillary Refill: Capillary refill takes less than 2 seconds.   Neurological:      General: No focal deficit present.      Mental Status: He is alert and oriented to person, place, and time. Mental status is at baseline.   Psychiatric:         Mood and Affect: Mood normal.         Behavior: Behavior normal.         Thought Content: Thought content normal.         Judgment: Judgment normal.         Assessment/Plan   Wellness exam             Orders Placed This Encounter   Procedures   • Lipid Panel With / Chol / HDL Ratio     Order Specific Question:   Release to patient     Answer:   Immediate       Follow " up: 6 month(s)

## 2021-06-23 LAB
CHOLEST SERPL-MCNC: 163 MG/DL (ref 0–200)
CHOLEST/HDLC SERPL: 3.98 {RATIO}
HDLC SERPL-MCNC: 41 MG/DL (ref 40–60)
LDLC SERPL CALC-MCNC: 99 MG/DL (ref 0–100)
TRIGL SERPL-MCNC: 130 MG/DL (ref 0–150)
VLDLC SERPL CALC-MCNC: 23 MG/DL (ref 5–40)

## 2021-08-09 RX ORDER — OMEPRAZOLE 40 MG/1
CAPSULE, DELAYED RELEASE ORAL
Qty: 90 CAPSULE | Refills: 3 | Status: SHIPPED | OUTPATIENT
Start: 2021-08-09 | End: 2022-08-03

## 2021-08-22 PROCEDURE — U0004 COV-19 TEST NON-CDC HGH THRU: HCPCS | Performed by: NURSE PRACTITIONER

## 2021-09-13 ENCOUNTER — TELEPHONE (OUTPATIENT)
Dept: FAMILY MEDICINE CLINIC | Facility: CLINIC | Age: 46
End: 2021-09-13

## 2021-09-13 RX ORDER — CLARITHROMYCIN 500 MG/1
500 TABLET, COATED ORAL EVERY 12 HOURS SCHEDULED
Qty: 20 TABLET | Refills: 0 | Status: SHIPPED | OUTPATIENT
Start: 2021-09-13 | End: 2021-09-23

## 2021-09-13 RX ORDER — METHYLPREDNISOLONE 4 MG/1
TABLET ORAL
Qty: 21 TABLET | Refills: 0 | Status: SHIPPED | OUTPATIENT
Start: 2021-09-13 | End: 2021-09-23

## 2021-09-13 NOTE — TELEPHONE ENCOUNTER
Pt called and stated that he has a lot of drainage going down his throat coughing and losing his voice he thinks that it is just sinus inf no fever he asked if u will call in meds or need to see him [phone]

## 2021-09-23 ENCOUNTER — OFFICE VISIT (OUTPATIENT)
Dept: FAMILY MEDICINE CLINIC | Facility: CLINIC | Age: 46
End: 2021-09-23

## 2021-09-23 ENCOUNTER — TELEPHONE (OUTPATIENT)
Dept: FAMILY MEDICINE CLINIC | Facility: CLINIC | Age: 46
End: 2021-09-23

## 2021-09-23 VITALS
TEMPERATURE: 98 F | DIASTOLIC BLOOD PRESSURE: 76 MMHG | WEIGHT: 273 LBS | BODY MASS INDEX: 33.24 KG/M2 | SYSTOLIC BLOOD PRESSURE: 138 MMHG | HEIGHT: 76 IN

## 2021-09-23 DIAGNOSIS — J02.9 ACUTE PHARYNGITIS, UNSPECIFIED ETIOLOGY: ICD-10-CM

## 2021-09-23 DIAGNOSIS — J40 BRONCHITIS: Primary | ICD-10-CM

## 2021-09-23 PROCEDURE — 99213 OFFICE O/P EST LOW 20 MIN: CPT | Performed by: NURSE PRACTITIONER

## 2021-09-23 RX ORDER — AMOXICILLIN AND CLAVULANATE POTASSIUM 875; 125 MG/1; MG/1
1 TABLET, FILM COATED ORAL 2 TIMES DAILY
Qty: 20 TABLET | Refills: 0 | Status: SHIPPED | OUTPATIENT
Start: 2021-09-23 | End: 2021-10-03

## 2021-09-23 RX ORDER — ALBUTEROL SULFATE 90 UG/1
2 AEROSOL, METERED RESPIRATORY (INHALATION) 4 TIMES DAILY PRN
Qty: 8 G | Refills: 0 | Status: SHIPPED | OUTPATIENT
Start: 2021-09-23 | End: 2021-12-21

## 2021-09-23 RX ORDER — GUAIFENESIN 600 MG/1
1200 TABLET, EXTENDED RELEASE ORAL 2 TIMES DAILY
Qty: 28 TABLET | Refills: 0 | Status: SHIPPED | OUTPATIENT
Start: 2021-09-23 | End: 2021-09-30

## 2021-09-23 NOTE — TELEPHONE ENCOUNTER
Caller: Omid Jarquin    Relationship to patient: Self    Best call back number: [phone]    Patient is needing:   PATIENT CALLED AND ADVISED RX WAS TAKEN BUT HE IS STILL FEELING BAD.
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
## 2021-09-23 NOTE — PROGRESS NOTES
Subjective   Chief Complaint:  Sore throat, cough    History of Present Illness:  This 46 y.o. male was seen in the office today.  He has finished a round of Biaxin and steroids for bilateral ear issues.  He reports he has residual cough and sore throat and still does not feel any better.  He reports he did test negative for Covid.    Allergies   Allergen Reactions   • Augmentin [Amoxicillin-Pot Clavulanate] Diarrhea     Has tolerated in past   • Avelox [Moxifloxacin] Itching   • Levaquin [Levofloxacin In D5w] Nausea Only      Current Outpatient Medications on File Prior to Visit   Medication Sig   • cetirizine (zyrTEC) 10 MG tablet Take 10 mg by mouth Daily.   • omeprazole (priLOSEC) 40 MG capsule TAKE 1 CAPSULE DAILY   • [DISCONTINUED] clarithromycin (Biaxin) 500 MG tablet Take 1 tablet by mouth Every 12 (Twelve) Hours for 10 days.   • [DISCONTINUED] methylPREDNISolone (MEDROL) 4 MG dose pack Take as directed on package instructions.     No current facility-administered medications on file prior to visit.      Past Medical, Surgical, Social, and Family History:  Past Medical History:   Diagnosis Date   • Allergic rhinitis    • C. difficile colitis    • Chronic rhinitis    • Chronic sinusitis    • Deviated nasal septum    • Eustachian tube dysfunction    • GERD (gastroesophageal reflux disease)    • Herpes    • Hypertrophy of nasal turbinates    • LPRD (laryngopharyngeal reflux disease)    • ADRIAN (obstructive sleep apnea)    • Sensorineural hearing loss    • Shift work sleep disorder    • Snoring    • Tinnitus    • Venous insufficiency      Past Surgical History:   Procedure Laterality Date   • COLONOSCOPY  08/23/2007    Normal exam   • COLONOSCOPY  09/05/2002    [facility] chronic proctitis rectum   • COLONOSCOPY N/A 6/12/2018    Procedure: COLONOSCOPY WITH ANESTHESIA;  Surgeon: Fritz Chambers MD;  Location: [facility] ENDOSCOPY;  Service: Gastroenterology   • ENDOSCOPY AND COLONOSCOPY  2001    Normal endo / Multiple  "aphthous ulcers in the rectum mild active proctitis   • HERNIA REPAIR      umbilical   • SEPTOPLASTY     • SINUS SURGERY  2000    [facility]   • TONSILLECTOMY  30yrs    clay     Social History     Socioeconomic History   • Marital status:      Spouse name: Not on file   • Number of children: Not on file   • Years of education: Not on file   • Highest education level: Not on file   Tobacco Use   • Smoking status: Never Smoker   • Smokeless tobacco: Never Used   Vaping Use   • Vaping Use: Never used   Substance and Sexual Activity   • Alcohol use: Yes     Comment: occasional   • Drug use: No   • Sexual activity: Defer     Family History   Problem Relation Age of Onset   • No Known Problems Mother    • Cancer Father         skin   • Colon cancer Father    • Colon polyps Father      Objective   Physical Exam  Constitutional:       General: He is not in acute distress.  HENT:      Right Ear: Hearing, tympanic membrane and ear canal normal.      Left Ear: Hearing, tympanic membrane and ear canal normal.      Mouth/Throat:      Pharynx: Oropharyngeal exudate and posterior oropharyngeal erythema present.   Cardiovascular:      Rate and Rhythm: Normal rate and regular rhythm.   Pulmonary:      Effort: Pulmonary effort is normal.      Breath sounds: Rhonchi (right upper, no diminishing in bases) present.   Lymphadenopathy:      Cervical: Cervical adenopathy (right anterior) present.   Neurological:      Mental Status: He is alert.     /76   Temp 98 °F (36.7 °C)   Ht 193 cm (75.98\")   Wt 124 kg (273 lb)   BMI 33.24 kg/m²     Assessment/Plan   Diagnoses and all orders for this visit:    1. Bronchitis (Primary)  -     guaiFENesin (Mucinex) 600 MG 12 hr tablet; Take 2 tablets by mouth 2 (Two) Times a Day for 7 days.  Dispense: 28 tablet; Refill: 0  -     albuterol sulfate  (90 Base) MCG/ACT inhaler; Inhale 2 puffs 4 (Four) Times a Day As Needed for Wheezing.  Dispense: 8 g; Refill: 0  -     " amoxicillin-clavulanate (Augmentin) 875-125 MG per tablet; Take 1 tablet by mouth 2 (Two) Times a Day for 10 days.  Dispense: 20 tablet; Refill: 0    2. Acute pharyngitis, unspecified etiology  -     amoxicillin-clavulanate (Augmentin) 875-125 MG per tablet; Take 1 tablet by mouth 2 (Two) Times a Day for 10 days.  Dispense: 20 tablet; Refill: 0    Discussion:  Advised and educated plan of care.      Follow-up:  Return if symptoms worsen or fail to improve.    Electronically signed by ZACARIAS Teresa, 09/23/21, 10:33 AM CDT.

## 2021-12-21 ENCOUNTER — OFFICE VISIT (OUTPATIENT)
Dept: FAMILY MEDICINE CLINIC | Facility: CLINIC | Age: 46
End: 2021-12-21

## 2021-12-21 VITALS
WEIGHT: 285 LBS | BODY MASS INDEX: 34.7 KG/M2 | RESPIRATION RATE: 16 BRPM | SYSTOLIC BLOOD PRESSURE: 132 MMHG | DIASTOLIC BLOOD PRESSURE: 86 MMHG | OXYGEN SATURATION: 97 % | HEIGHT: 76 IN | HEART RATE: 61 BPM

## 2021-12-21 DIAGNOSIS — Z12.5 SCREENING PSA (PROSTATE SPECIFIC ANTIGEN): ICD-10-CM

## 2021-12-21 DIAGNOSIS — E66.9 OBESITY, UNSPECIFIED OBESITY SEVERITY, UNSPECIFIED OBESITY TYPE: Primary | ICD-10-CM

## 2021-12-21 DIAGNOSIS — K21.9 GASTROESOPHAGEAL REFLUX DISEASE, UNSPECIFIED WHETHER ESOPHAGITIS PRESENT: ICD-10-CM

## 2021-12-21 DIAGNOSIS — R60.0 LEG EDEMA: ICD-10-CM

## 2021-12-21 PROCEDURE — 99214 OFFICE O/P EST MOD 30 MIN: CPT | Performed by: FAMILY MEDICINE

## 2021-12-21 NOTE — PROGRESS NOTES
Subjective   Omid Jarquin is a 46 y.o. male.     Chief Complaint   Patient presents with   • Heartburn     6 mo f/u        History of Present Illness     he notes his gerd symptoms are controled with ppi---he is noting sob with exertion--he is noted leg sweling      Current Outpatient Medications:   •  cetirizine (zyrTEC) 10 MG tablet, Take 10 mg by mouth Daily., Disp: , Rfl:   •  omeprazole (priLOSEC) 40 MG capsule, TAKE 1 CAPSULE DAILY, Disp: 90 capsule, Rfl: 3  Allergies   Allergen Reactions   • Augmentin [Amoxicillin-Pot Clavulanate] Diarrhea     Has tolerated in past   • Avelox [Moxifloxacin] Itching   • Levaquin [Levofloxacin In D5w] Nausea Only       Past Medical History:   Diagnosis Date   • Allergic rhinitis    • C. difficile colitis    • Chronic rhinitis    • Chronic sinusitis    • Deviated nasal septum    • Eustachian tube dysfunction    • GERD (gastroesophageal reflux disease)    • Herpes    • Hypertrophy of nasal turbinates    • LPRD (laryngopharyngeal reflux disease)    • ADRIAN (obstructive sleep apnea)    • Sensorineural hearing loss    • Shift work sleep disorder    • Snoring    • Tinnitus    • Venous insufficiency      Past Surgical History:   Procedure Laterality Date   • COLONOSCOPY  08/23/2007    Normal exam   • COLONOSCOPY  09/05/2002    [facility] chronic proctitis rectum   • COLONOSCOPY N/A 6/12/2018    Procedure: COLONOSCOPY WITH ANESTHESIA;  Surgeon: Fritz Chambers MD;  Location: [facility] ENDOSCOPY;  Service: Gastroenterology   • ENDOSCOPY AND COLONOSCOPY  2001    Normal endo / Multiple aphthous ulcers in the rectum mild active proctitis   • HERNIA REPAIR      umbilical   • SEPTOPLASTY     • SINUS SURGERY  2000    [facility]   • TONSILLECTOMY  30yrs    clay       Review of Systems   Constitutional: Negative.    HENT: Negative.    Eyes: Negative.    Respiratory: Negative.    Cardiovascular: Positive for leg swelling.   Gastrointestinal: Negative.    Endocrine: Negative.    Genitourinary: Negative.   "  Musculoskeletal: Negative.    Skin: Negative.    Allergic/Immunologic: Negative.    Neurological: Negative.    Hematological: Negative.    Psychiatric/Behavioral: Negative.        Objective  /86   Pulse 61   Resp 16   Ht 193 cm (76\")   Wt 129 kg (285 lb)   SpO2 97%   BMI 34.69 kg/m²   Physical Exam  Vitals and nursing note reviewed.   Constitutional:       Appearance: Normal appearance. He is normal weight.   HENT:      Head: Normocephalic and atraumatic.      Nose: Nose normal.      Mouth/Throat:      Mouth: Mucous membranes are moist.   Eyes:      Pupils: Pupils are equal, round, and reactive to light.   Cardiovascular:      Rate and Rhythm: Normal rate and regular rhythm.      Pulses: Normal pulses.      Heart sounds: Normal heart sounds.   Pulmonary:      Effort: Pulmonary effort is normal.      Breath sounds: Normal breath sounds.   Abdominal:      General: Abdomen is flat. Bowel sounds are normal.      Palpations: Abdomen is soft.   Musculoskeletal:         General: Swelling present.      Cervical back: Normal range of motion.      Right lower leg: Edema present.      Left lower leg: Edema present.   Skin:     General: Skin is warm and dry.      Capillary Refill: Capillary refill takes less than 2 seconds.   Neurological:      General: No focal deficit present.      Mental Status: He is alert and oriented to person, place, and time. Mental status is at baseline.   Psychiatric:         Mood and Affect: Mood normal.         Assessment/Plan   Diagnoses and all orders for this visit:    1. Obesity, unspecified obesity severity, unspecified obesity type (Primary)  -     PSA Screen  -     CBC & Differential  -     Comprehensive metabolic panel  -     Lipid Panel With / Chol / HDL Ratio  -     TSH  -     Urinalysis without microscopic (no culture) - Urine, Clean Catch    2. Gastroesophageal reflux disease, unspecified whether esophagitis present  -     PSA Screen  -     CBC & Differential  -     " Comprehensive metabolic panel  -     Lipid Panel With / Chol / HDL Ratio  -     TSH  -     Urinalysis without microscopic (no culture) - Urine, Clean Catch    3. Leg edema  -     PSA Screen  -     CBC & Differential  -     Comprehensive metabolic panel  -     Lipid Panel With / Chol / HDL Ratio  -     TSH  -     Urinalysis without microscopic (no culture) - Urine, Clean Catch    4. Screening PSA (prostate specific antigen)  -     PSA Screen    we discussed weight loss and his cardiac eval from this  Year.  He willl moinitor for dsphgia..               Orders Placed This Encounter   Procedures   • PSA Screen     Order Specific Question:   Release to patient     Answer:   Immediate   • Comprehensive metabolic panel     Order Specific Question:   Release to patient     Answer:   Immediate   • Lipid Panel With / Chol / HDL Ratio     Order Specific Question:   Release to patient     Answer:   Immediate   • TSH     Order Specific Question:   Release to patient     Answer:   Immediate   • Urinalysis without microscopic (no culture) - Urine, Clean Catch     Order Specific Question:   Release to patient     Answer:   Immediate   • CBC & Differential       Follow up: 6 month(s)

## 2021-12-22 LAB
ALBUMIN SERPL-MCNC: 4.4 G/DL (ref 3.5–5.2)
ALBUMIN/GLOB SERPL: 2.2 G/DL
ALP SERPL-CCNC: 68 U/L (ref 39–117)
ALT SERPL-CCNC: 35 U/L (ref 1–41)
APPEARANCE UR: CLEAR
AST SERPL-CCNC: 22 U/L (ref 1–40)
BASOPHILS # BLD AUTO: 0.05 10*3/MM3 (ref 0–0.2)
BASOPHILS NFR BLD AUTO: 0.9 % (ref 0–1.5)
BILIRUB SERPL-MCNC: 0.4 MG/DL (ref 0–1.2)
BILIRUB UR QL STRIP: NEGATIVE
BUN SERPL-MCNC: 13 MG/DL (ref 6–20)
BUN/CREAT SERPL: 14.1 (ref 7–25)
CALCIUM SERPL-MCNC: 9.3 MG/DL (ref 8.6–10.5)
CHLORIDE SERPL-SCNC: 101 MMOL/L (ref 98–107)
CHOLEST SERPL-MCNC: 181 MG/DL (ref 0–200)
CHOLEST/HDLC SERPL: 4.11 {RATIO}
CO2 SERPL-SCNC: 29.3 MMOL/L (ref 22–29)
COLOR UR: YELLOW
CREAT SERPL-MCNC: 0.92 MG/DL (ref 0.76–1.27)
EOSINOPHIL # BLD AUTO: 0.11 10*3/MM3 (ref 0–0.4)
EOSINOPHIL NFR BLD AUTO: 2.1 % (ref 0.3–6.2)
ERYTHROCYTE [DISTWIDTH] IN BLOOD BY AUTOMATED COUNT: 13.6 % (ref 12.3–15.4)
GLOBULIN SER CALC-MCNC: 2 GM/DL
GLUCOSE SERPL-MCNC: 98 MG/DL (ref 65–99)
GLUCOSE UR QL: NEGATIVE
HCT VFR BLD AUTO: 43.2 % (ref 37.5–51)
HDLC SERPL-MCNC: 44 MG/DL (ref 40–60)
HGB BLD-MCNC: 14.3 G/DL (ref 13–17.7)
HGB UR QL STRIP: NEGATIVE
IMM GRANULOCYTES # BLD AUTO: 0.01 10*3/MM3 (ref 0–0.05)
IMM GRANULOCYTES NFR BLD AUTO: 0.2 % (ref 0–0.5)
KETONES UR QL STRIP: NEGATIVE
LDLC SERPL CALC-MCNC: 109 MG/DL (ref 0–100)
LEUKOCYTE ESTERASE UR QL STRIP: NEGATIVE
LYMPHOCYTES # BLD AUTO: 1.3 10*3/MM3 (ref 0.7–3.1)
LYMPHOCYTES NFR BLD AUTO: 24.3 % (ref 19.6–45.3)
MCH RBC QN AUTO: 27.7 PG (ref 26.6–33)
MCHC RBC AUTO-ENTMCNC: 33.1 G/DL (ref 31.5–35.7)
MCV RBC AUTO: 83.6 FL (ref 79–97)
MONOCYTES # BLD AUTO: 0.41 10*3/MM3 (ref 0.1–0.9)
MONOCYTES NFR BLD AUTO: 7.7 % (ref 5–12)
NEUTROPHILS # BLD AUTO: 3.46 10*3/MM3 (ref 1.7–7)
NEUTROPHILS NFR BLD AUTO: 64.8 % (ref 42.7–76)
NITRITE UR QL STRIP: NEGATIVE
NRBC BLD AUTO-RTO: 0 /100 WBC (ref 0–0.2)
PH UR STRIP: 6.5 [PH] (ref 5–8)
PLATELET # BLD AUTO: 234 10*3/MM3 (ref 140–450)
POTASSIUM SERPL-SCNC: 4.1 MMOL/L (ref 3.5–5.2)
PROT SERPL-MCNC: 6.4 G/DL (ref 6–8.5)
PROT UR QL STRIP: NEGATIVE
PSA SERPL-MCNC: 0.62 NG/ML (ref 0–4)
RBC # BLD AUTO: 5.17 10*6/MM3 (ref 4.14–5.8)
SODIUM SERPL-SCNC: 137 MMOL/L (ref 136–145)
SP GR UR: NORMAL (ref 1–1.03)
TRIGL SERPL-MCNC: 161 MG/DL (ref 0–150)
TSH SERPL DL<=0.005 MIU/L-ACNC: 2.23 UIU/ML (ref 0.27–4.2)
UROBILINOGEN UR STRIP-MCNC: NORMAL MG/DL
VLDLC SERPL CALC-MCNC: 28 MG/DL (ref 5–40)
WBC # BLD AUTO: 5.34 10*3/MM3 (ref 3.4–10.8)

## 2022-01-10 ENCOUNTER — TELEPHONE (OUTPATIENT)
Dept: FAMILY MEDICINE CLINIC | Facility: CLINIC | Age: 47
End: 2022-01-10

## 2022-01-10 DIAGNOSIS — R05.9 COUGH: ICD-10-CM

## 2022-01-10 DIAGNOSIS — R51.9 NONINTRACTABLE HEADACHE, UNSPECIFIED CHRONICITY PATTERN, UNSPECIFIED HEADACHE TYPE: Primary | ICD-10-CM

## 2022-01-10 DIAGNOSIS — J02.9 SORE THROAT: ICD-10-CM

## 2022-01-10 DIAGNOSIS — H93.8X9 PRESSURE SENSATION IN EAR, UNSPECIFIED LATERALITY: ICD-10-CM

## 2022-01-10 DIAGNOSIS — R05.9 COUGH: Primary | ICD-10-CM

## 2022-01-10 NOTE — TELEPHONE ENCOUNTER
Omid came by and said that he has had a HA, Sore throat, productive cough & ear pressure.  Do you want to see him or just call in med?   [phone]

## 2022-01-11 ENCOUNTER — TELEPHONE (OUTPATIENT)
Dept: FAMILY MEDICINE CLINIC | Facility: CLINIC | Age: 47
End: 2022-01-11

## 2022-01-11 RX ORDER — CLARITHROMYCIN 500 MG/1
500 TABLET, COATED ORAL EVERY 12 HOURS SCHEDULED
Qty: 20 TABLET | Refills: 0 | Status: SHIPPED | OUTPATIENT
Start: 2022-01-11 | End: 2022-01-21

## 2022-01-26 ENCOUNTER — OFFICE VISIT (OUTPATIENT)
Dept: FAMILY MEDICINE CLINIC | Facility: CLINIC | Age: 47
End: 2022-01-26

## 2022-01-26 VITALS
OXYGEN SATURATION: 98 % | DIASTOLIC BLOOD PRESSURE: 74 MMHG | SYSTOLIC BLOOD PRESSURE: 136 MMHG | HEART RATE: 78 BPM | RESPIRATION RATE: 16 BRPM | TEMPERATURE: 98.5 F | HEIGHT: 76 IN | BODY MASS INDEX: 34.46 KG/M2 | WEIGHT: 283 LBS

## 2022-01-26 DIAGNOSIS — H65.03 BILATERAL ACUTE SEROUS OTITIS MEDIA, RECURRENCE NOT SPECIFIED: Primary | ICD-10-CM

## 2022-01-26 DIAGNOSIS — L02.211 ABSCESS OF SKIN OF ABDOMEN: ICD-10-CM

## 2022-01-26 PROCEDURE — 99213 OFFICE O/P EST LOW 20 MIN: CPT | Performed by: FAMILY MEDICINE

## 2022-01-26 RX ORDER — METHYLPREDNISOLONE 4 MG/1
TABLET ORAL
Qty: 21 TABLET | Refills: 0 | Status: SHIPPED | OUTPATIENT
Start: 2022-01-26 | End: 2022-02-15

## 2022-01-26 RX ORDER — MINOCYCLINE HYDROCHLORIDE 100 MG/1
100 CAPSULE ORAL 2 TIMES DAILY
Qty: 28 CAPSULE | Refills: 0 | Status: SHIPPED | OUTPATIENT
Start: 2022-01-26 | End: 2022-02-15

## 2022-01-26 NOTE — PROGRESS NOTES
Subjective   Omid Jarquin is a 46 y.o. male.     Chief Complaint   Patient presents with   • Earache     earsf full       History of Present Illness     he notes left hearing loss andalso a redness area of fullness of the left abdomen      Current Outpatient Medications:   •  cetirizine (zyrTEC) 10 MG tablet, Take 10 mg by mouth Daily., Disp: , Rfl:   •  omeprazole (priLOSEC) 40 MG capsule, TAKE 1 CAPSULE DAILY, Disp: 90 capsule, Rfl: 3  •  methylPREDNISolone (MEDROL) 4 MG dose pack, Take as directed on package instructions., Disp: 21 tablet, Rfl: 0  •  minocycline (Minocin) 100 MG capsule, Take 1 capsule by mouth 2 (Two) Times a Day., Disp: 28 capsule, Rfl: 0  Allergies   Allergen Reactions   • Augmentin [Amoxicillin-Pot Clavulanate] Diarrhea     Has tolerated in past   • Avelox [Moxifloxacin] Itching   • Levaquin [Levofloxacin In D5w] Nausea Only       Past Medical History:   Diagnosis Date   • Allergic rhinitis    • C. difficile colitis    • Chronic rhinitis    • Chronic sinusitis    • Deviated nasal septum    • Eustachian tube dysfunction    • GERD (gastroesophageal reflux disease)    • Herpes    • Hypertrophy of nasal turbinates    • LPRD (laryngopharyngeal reflux disease)    • ADRIAN (obstructive sleep apnea)    • Sensorineural hearing loss    • Shift work sleep disorder    • Snoring    • Tinnitus    • Venous insufficiency      Past Surgical History:   Procedure Laterality Date   • COLONOSCOPY  08/23/2007    Normal exam   • COLONOSCOPY  09/05/2002    [facility] chronic proctitis rectum   • COLONOSCOPY N/A 6/12/2018    Procedure: COLONOSCOPY WITH ANESTHESIA;  Surgeon: Fritz Chambers MD;  Location: [facility] ENDOSCOPY;  Service: Gastroenterology   • ENDOSCOPY AND COLONOSCOPY  2001    Normal endo / Multiple aphthous ulcers in the rectum mild active proctitis   • HERNIA REPAIR      umbilical   • SEPTOPLASTY     • SINUS SURGERY  2000    [facility]   • TONSILLECTOMY  30yrs    clay       Review of Systems   Constitutional:  "Negative.    HENT: Positive for hearing loss.    Eyes: Negative.    Respiratory: Negative.    Cardiovascular: Negative.    Gastrointestinal: Negative.    Endocrine: Negative.    Genitourinary: Negative.    Musculoskeletal: Negative.    Skin: Positive for wound.   Allergic/Immunologic: Negative.    Neurological: Negative.    Hematological: Negative.    Psychiatric/Behavioral: Negative.        Objective  /74   Pulse 78   Temp 98.5 °F (36.9 °C)   Resp 16   Ht 193 cm (75.98\")   Wt 128 kg (283 lb)   SpO2 98%   BMI 34.46 kg/m²   Physical Exam  Vitals reviewed.   Constitutional:       Appearance: Normal appearance. He is normal weight.   HENT:      Head: Normocephalic and atraumatic.      Ears:      Comments: Both tmsfluid filled and retrdacted     Nose: Congestion and rhinorrhea present.      Mouth/Throat:      Mouth: Mucous membranes are moist.   Eyes:      Pupils: Pupils are equal, round, and reactive to light.   Cardiovascular:      Rate and Rhythm: Normal rate and regular rhythm.      Pulses: Normal pulses.   Pulmonary:      Effort: Pulmonary effort is normal.   Abdominal:      General: Abdomen is flat.   Musculoskeletal:         General: Normal range of motion.      Cervical back: Normal range of motion and neck supple.   Skin:     General: Skin is warm.      Capillary Refill: Capillary refill takes less than 2 seconds.   Neurological:      General: No focal deficit present.      Mental Status: He is alert.   Psychiatric:         Mood and Affect: Mood normal.         Assessment/Plan   Diagnoses and all orders for this visit:    1. Bilateral acute serous otitis media, recurrence not specified (Primary)    2. Abscess of skin of abdomen    Other orders  -     minocycline (Minocin) 100 MG capsule; Take 1 capsule by mouth 2 (Two) Times a Day.  Dispense: 28 capsule; Refill: 0  -     methylPREDNISolone (MEDROL) 4 MG dose pack; Take as directed on package instructions.  Dispense: 21 tablet; Refill: 0             "     No orders of the defined types were placed in this encounter.      Follow up: 3 week(s)

## 2022-02-15 ENCOUNTER — OFFICE VISIT (OUTPATIENT)
Dept: FAMILY MEDICINE CLINIC | Facility: CLINIC | Age: 47
End: 2022-02-15

## 2022-02-15 ENCOUNTER — DOCUMENTATION (OUTPATIENT)
Dept: FAMILY MEDICINE CLINIC | Facility: CLINIC | Age: 47
End: 2022-02-15

## 2022-02-15 VITALS
RESPIRATION RATE: 16 BRPM | OXYGEN SATURATION: 97 % | SYSTOLIC BLOOD PRESSURE: 122 MMHG | DIASTOLIC BLOOD PRESSURE: 80 MMHG | HEIGHT: 76 IN | BODY MASS INDEX: 34.34 KG/M2 | HEART RATE: 76 BPM | WEIGHT: 282 LBS

## 2022-02-15 DIAGNOSIS — J00 ACUTE RHINITIS: ICD-10-CM

## 2022-02-15 DIAGNOSIS — R53.83 OTHER FATIGUE: ICD-10-CM

## 2022-02-15 DIAGNOSIS — R05.9 COUGH: Primary | ICD-10-CM

## 2022-02-15 DIAGNOSIS — R22.2 ABDOMINAL WALL MASS: Primary | ICD-10-CM

## 2022-02-15 PROCEDURE — 99213 OFFICE O/P EST LOW 20 MIN: CPT | Performed by: FAMILY MEDICINE

## 2022-02-15 RX ORDER — AMOXICILLIN 500 MG/1
500 CAPSULE ORAL 3 TIMES DAILY
Qty: 30 CAPSULE | Refills: 0 | Status: SHIPPED | OUTPATIENT
Start: 2022-02-15 | End: 2022-06-20

## 2022-02-15 NOTE — PROGRESS NOTES
Subjective   Omid Jarquin is a 46 y.o. male.     Chief Complaint   Patient presents with   • Follow-up     3 week f/u   cyst left abd/flnk   • Sinus Problem     congestion, pressure around eyes &        History of Present Illness     he nots nasal pressure with congestion..denies fever---he has not been covid tested..  He is noting a cystic mass on the left upper abdomen for a few days    Current Outpatient Medications:   •  amoxicillin (AMOXIL) 500 MG capsule, Take 1 capsule by mouth 3 (Three) Times a Day., Disp: 30 capsule, Rfl: 0  •  cetirizine (zyrTEC) 10 MG tablet, Take 10 mg by mouth Daily., Disp: , Rfl:   •  omeprazole (priLOSEC) 40 MG capsule, TAKE 1 CAPSULE DAILY, Disp: 90 capsule, Rfl: 3  Allergies   Allergen Reactions   • Augmentin [Amoxicillin-Pot Clavulanate] Diarrhea     Has tolerated in past   • Avelox [Moxifloxacin] Itching   • Levaquin [Levofloxacin In D5w] Nausea Only       Past Medical History:   Diagnosis Date   • Allergic rhinitis    • C. difficile colitis    • Chronic rhinitis    • Chronic sinusitis    • Deviated nasal septum    • Eustachian tube dysfunction    • GERD (gastroesophageal reflux disease)    • Herpes    • Hypertrophy of nasal turbinates    • LPRD (laryngopharyngeal reflux disease)    • ADRIAN (obstructive sleep apnea)    • Sensorineural hearing loss    • Shift work sleep disorder    • Snoring    • Tinnitus    • Venous insufficiency      Past Surgical History:   Procedure Laterality Date   • COLONOSCOPY  08/23/2007    Normal exam   • COLONOSCOPY  09/05/2002    [facility] chronic proctitis rectum   • COLONOSCOPY N/A 6/12/2018    Procedure: COLONOSCOPY WITH ANESTHESIA;  Surgeon: Fritz Chambers MD;  Location: [facility] ENDOSCOPY;  Service: Gastroenterology   • ENDOSCOPY AND COLONOSCOPY  2001    Normal endo / Multiple aphthous ulcers in the rectum mild active proctitis   • HERNIA REPAIR      umbilical   • SEPTOPLASTY     • SINUS SURGERY  2000    resser   • TONSILLECTOMY  30yrs    clay  "      Review of Systems   Constitutional: Negative.    HENT: Positive for congestion, rhinorrhea and sinus pain.    Eyes: Negative.    Respiratory: Negative.    Cardiovascular: Negative.    Gastrointestinal: Negative.    Endocrine: Negative.    Genitourinary: Negative.    Musculoskeletal: Negative.    Skin: Negative.    Allergic/Immunologic: Negative.    Neurological: Negative.    Hematological: Negative.    Psychiatric/Behavioral: Negative.        Objective  /80   Pulse 76   Resp 16   Ht 193 cm (76\")   Wt 128 kg (282 lb)   SpO2 97%   BMI 34.33 kg/m²   Physical Exam  Vitals reviewed.   Constitutional:       Appearance: Normal appearance. He is normal weight.   HENT:      Head: Normocephalic and atraumatic.      Nose: Congestion and rhinorrhea present.      Mouth/Throat:      Mouth: Mucous membranes are moist.      Pharynx: Oropharynx is clear.   Eyes:      Extraocular Movements: Extraocular movements intact.      Conjunctiva/sclera: Conjunctivae normal.      Pupils: Pupils are equal, round, and reactive to light.   Cardiovascular:      Rate and Rhythm: Normal rate and regular rhythm.      Pulses: Normal pulses.      Heart sounds: Normal heart sounds.   Pulmonary:      Effort: Pulmonary effort is normal.      Breath sounds: Normal breath sounds.   Abdominal:      General: Abdomen is flat. Bowel sounds are normal.      Palpations: Abdomen is soft.   Musculoskeletal:         General: Normal range of motion.      Cervical back: Normal range of motion and neck supple.   Skin:     General: Skin is warm and dry.      Capillary Refill: Capillary refill takes less than 2 seconds.   Neurological:      General: No focal deficit present.      Mental Status: He is alert and oriented to person, place, and time. Mental status is at baseline.   Psychiatric:         Mood and Affect: Mood normal.         Behavior: Behavior normal.         Thought Content: Thought content normal.         Judgment: Judgment normal. "         Assessment/Plan   Diagnoses and all orders for this visit:    1. Abdominal wall mass (Primary)  -     Ambulatory Referral to General Surgery    2. Acute rhinitis    Other orders  -     amoxicillin (AMOXIL) 500 MG capsule; Take 1 capsule by mouth 3 (Three) Times a Day.  Dispense: 30 capsule; Refill: 0      Send out to [facility] for testing           Orders Placed This Encounter   Procedures   • Ambulatory Referral to General Surgery     Referral Priority:   Routine     Referral Type:   Consultation     Referral Reason:   Specialty Services Required     Requested Specialty:   General Surgery     Number of Visits Requested:   1       Follow up: 4 month(s)

## 2022-02-17 ENCOUNTER — DOCUMENTATION (OUTPATIENT)
Dept: FAMILY MEDICINE CLINIC | Facility: CLINIC | Age: 47
End: 2022-02-17

## 2022-04-07 ENCOUNTER — TELEPHONE (OUTPATIENT)
Dept: FAMILY MEDICINE CLINIC | Facility: CLINIC | Age: 47
End: 2022-04-07

## 2022-04-07 RX ORDER — CLARITHROMYCIN 500 MG/1
500 TABLET, COATED ORAL EVERY 12 HOURS SCHEDULED
Qty: 20 TABLET | Refills: 0 | Status: SHIPPED | OUTPATIENT
Start: 2022-04-07 | End: 2022-04-17

## 2022-04-07 NOTE — TELEPHONE ENCOUNTER
Pt called he has a sinus inf with yellow mucus no fever he is asking if you will call in anbtc md2

## 2022-06-20 ENCOUNTER — OFFICE VISIT (OUTPATIENT)
Dept: FAMILY MEDICINE CLINIC | Facility: CLINIC | Age: 47
End: 2022-06-20

## 2022-06-20 VITALS
HEIGHT: 76 IN | RESPIRATION RATE: 14 BRPM | OXYGEN SATURATION: 98 % | DIASTOLIC BLOOD PRESSURE: 95 MMHG | BODY MASS INDEX: 34.34 KG/M2 | HEART RATE: 60 BPM | WEIGHT: 282 LBS | SYSTOLIC BLOOD PRESSURE: 161 MMHG

## 2022-06-20 DIAGNOSIS — K21.9 GASTROESOPHAGEAL REFLUX DISEASE, UNSPECIFIED WHETHER ESOPHAGITIS PRESENT: Primary | ICD-10-CM

## 2022-06-20 PROCEDURE — 99213 OFFICE O/P EST LOW 20 MIN: CPT | Performed by: FAMILY MEDICINE

## 2022-06-20 NOTE — PROGRESS NOTES
Subjective   Omid Jarquin is a 47 y.o. male.     No chief complaint on file.       History of Present Illness     he is seeing chiropcactor for back pain--his gerd symptoms are stable without dysspahgia      Current Outpatient Medications:   •  cetirizine (zyrTEC) 10 MG tablet, Take 10 mg by mouth Daily., Disp: , Rfl:   •  omeprazole (priLOSEC) 40 MG capsule, TAKE 1 CAPSULE DAILY, Disp: 90 capsule, Rfl: 3  Allergies   Allergen Reactions   • Augmentin [Amoxicillin-Pot Clavulanate] Diarrhea     Has tolerated in past   • Avelox [Moxifloxacin] Itching   • Levaquin [Levofloxacin In D5w] Nausea Only       BMI is >= 30 and <35. (Class 1 Obesity). The following options were offered after discussion;: nutrition counseling/recommendations      Past Medical History:   Diagnosis Date   • Allergic rhinitis    • C. difficile colitis    • Chronic rhinitis    • Chronic sinusitis    • Deviated nasal septum    • Eustachian tube dysfunction    • GERD (gastroesophageal reflux disease)    • Herpes    • Hypertrophy of nasal turbinates    • LPRD (laryngopharyngeal reflux disease)    • ADRIAN (obstructive sleep apnea)    • Sensorineural hearing loss    • Shift work sleep disorder    • Snoring    • Tinnitus    • Venous insufficiency      Past Surgical History:   Procedure Laterality Date   • COLONOSCOPY  08/23/2007    Normal exam   • COLONOSCOPY  09/05/2002    [facility] chronic proctitis rectum   • COLONOSCOPY N/A 6/12/2018    Procedure: COLONOSCOPY WITH ANESTHESIA;  Surgeon: Fritz Chambers MD;  Location: [facility] ENDOSCOPY;  Service: Gastroenterology   • ENDOSCOPY AND COLONOSCOPY  2001    Normal endo / Multiple aphthous ulcers in the rectum mild active proctitis   • HERNIA REPAIR      umbilical   • SEPTOPLASTY     • SINUS SURGERY  2000    [facility]   • TONSILLECTOMY  30yrs    clay       Review of Systems   Constitutional: Negative.    HENT: Negative.    Eyes: Negative.    Respiratory: Negative.    Cardiovascular: Negative.    Gastrointestinal:  "Negative.    Endocrine: Negative.    Genitourinary: Negative.    Skin: Negative.    Allergic/Immunologic: Negative.    Neurological: Negative.    Hematological: Negative.    Psychiatric/Behavioral: Negative.        Objective  /95   Pulse 60   Resp 14   Ht 193 cm (76\")   Wt 128 kg (282 lb)   SpO2 98%   BMI 34.33 kg/m² ---bp 120/75  Physical Exam  Vitals and nursing note reviewed.   Constitutional:       Appearance: Normal appearance.   HENT:      Head: Normocephalic and atraumatic.      Nose: Nose normal.      Mouth/Throat:      Mouth: Mucous membranes are dry.   Eyes:      Pupils: Pupils are equal, round, and reactive to light.   Cardiovascular:      Rate and Rhythm: Normal rate and regular rhythm.      Pulses: Normal pulses.      Heart sounds: Normal heart sounds.   Pulmonary:      Effort: Pulmonary effort is normal.      Breath sounds: Normal breath sounds.   Abdominal:      General: Abdomen is flat. Bowel sounds are normal.      Palpations: Abdomen is soft.   Musculoskeletal:         General: Normal range of motion.      Cervical back: Normal range of motion.   Skin:     General: Skin is warm and dry.      Capillary Refill: Capillary refill takes less than 2 seconds.   Neurological:      General: No focal deficit present.      Mental Status: He is alert and oriented to person, place, and time. Mental status is at baseline.   Psychiatric:         Mood and Affect: Mood normal.         Assessment & Plan   Diagnoses and all orders for this visit:    1. Gastroesophageal reflux disease, unspecified whether esophagitis present (Primary)  -     CBC & Differential  -     Comprehensive metabolic panel  -     Lipid Panel With / Chol / HDL Ratio                 Orders Placed This Encounter   Procedures   • Comprehensive metabolic panel     Order Specific Question:   Release to patient     Answer:   Immediate   • Lipid Panel With / Chol / HDL Ratio     Order Specific Question:   Release to patient     Answer:   " Immediate   • CBC & Differential       Follow up: 12 month(s)

## 2022-06-21 LAB
ALBUMIN SERPL-MCNC: 4.7 G/DL (ref 3.5–5.2)
ALBUMIN/GLOB SERPL: 2.4 G/DL
ALP SERPL-CCNC: 77 U/L (ref 39–117)
ALT SERPL-CCNC: 39 U/L (ref 1–41)
AST SERPL-CCNC: 24 U/L (ref 1–40)
BASOPHILS # BLD AUTO: 0.06 10*3/MM3 (ref 0–0.2)
BASOPHILS NFR BLD AUTO: 1.1 % (ref 0–1.5)
BILIRUB SERPL-MCNC: 0.4 MG/DL (ref 0–1.2)
BUN SERPL-MCNC: 15 MG/DL (ref 6–20)
BUN/CREAT SERPL: 14.9 (ref 7–25)
CALCIUM SERPL-MCNC: 9.1 MG/DL (ref 8.6–10.5)
CHLORIDE SERPL-SCNC: 102 MMOL/L (ref 98–107)
CHOLEST SERPL-MCNC: 187 MG/DL (ref 0–200)
CHOLEST/HDLC SERPL: 3.98 {RATIO}
CO2 SERPL-SCNC: 25.8 MMOL/L (ref 22–29)
CREAT SERPL-MCNC: 1.01 MG/DL (ref 0.76–1.27)
EGFRCR SERPLBLD CKD-EPI 2021: 91.2 ML/MIN/1.73
EOSINOPHIL # BLD AUTO: 0.13 10*3/MM3 (ref 0–0.4)
EOSINOPHIL NFR BLD AUTO: 2.4 % (ref 0.3–6.2)
ERYTHROCYTE [DISTWIDTH] IN BLOOD BY AUTOMATED COUNT: 13.8 % (ref 12.3–15.4)
GLOBULIN SER CALC-MCNC: 2 GM/DL
GLUCOSE SERPL-MCNC: 102 MG/DL (ref 65–99)
HCT VFR BLD AUTO: 41.6 % (ref 37.5–51)
HDLC SERPL-MCNC: 47 MG/DL (ref 40–60)
HGB BLD-MCNC: 14.3 G/DL (ref 13–17.7)
IMM GRANULOCYTES # BLD AUTO: 0.02 10*3/MM3 (ref 0–0.05)
IMM GRANULOCYTES NFR BLD AUTO: 0.4 % (ref 0–0.5)
LDLC SERPL CALC-MCNC: 116 MG/DL (ref 0–100)
LYMPHOCYTES # BLD AUTO: 1.44 10*3/MM3 (ref 0.7–3.1)
LYMPHOCYTES NFR BLD AUTO: 26.5 % (ref 19.6–45.3)
MCH RBC QN AUTO: 28.3 PG (ref 26.6–33)
MCHC RBC AUTO-ENTMCNC: 34.4 G/DL (ref 31.5–35.7)
MCV RBC AUTO: 82.4 FL (ref 79–97)
MONOCYTES # BLD AUTO: 0.39 10*3/MM3 (ref 0.1–0.9)
MONOCYTES NFR BLD AUTO: 7.2 % (ref 5–12)
NEUTROPHILS # BLD AUTO: 3.39 10*3/MM3 (ref 1.7–7)
NEUTROPHILS NFR BLD AUTO: 62.4 % (ref 42.7–76)
NRBC BLD AUTO-RTO: 0 /100 WBC (ref 0–0.2)
PLATELET # BLD AUTO: 203 10*3/MM3 (ref 140–450)
POTASSIUM SERPL-SCNC: 4.3 MMOL/L (ref 3.5–5.2)
PROT SERPL-MCNC: 6.7 G/DL (ref 6–8.5)
RBC # BLD AUTO: 5.05 10*6/MM3 (ref 4.14–5.8)
SODIUM SERPL-SCNC: 137 MMOL/L (ref 136–145)
TRIGL SERPL-MCNC: 132 MG/DL (ref 0–150)
VLDLC SERPL CALC-MCNC: 24 MG/DL (ref 5–40)
WBC # BLD AUTO: 5.43 10*3/MM3 (ref 3.4–10.8)

## 2022-07-13 ENCOUNTER — TELEPHONE (OUTPATIENT)
Dept: FAMILY MEDICINE CLINIC | Facility: CLINIC | Age: 47
End: 2022-07-13

## 2022-07-13 DIAGNOSIS — J02.9 SORE THROAT: Primary | ICD-10-CM

## 2022-07-13 DIAGNOSIS — R05.9 COUGH: ICD-10-CM

## 2022-07-13 RX ORDER — CLINDAMYCIN HYDROCHLORIDE 300 MG/1
300 CAPSULE ORAL 4 TIMES DAILY
Qty: 28 CAPSULE | Refills: 0 | Status: SHIPPED | OUTPATIENT
Start: 2022-07-13 | End: 2022-07-20

## 2022-07-13 NOTE — TELEPHONE ENCOUNTER
Caller: Omid Jarquin    Relationship: Self    Best call back number: [phone]        What test was performed:  COVID AND STREP TEST    When was the test performed: 7/13/22    Where was the test performed: [facility]    Additional notes: THE PATIENT STATES THAT HE WOULD LIKE A PHONE CALL BACK REGARDING HIS COVID AND STREP TEST RESULTS FROM 7/13/22

## 2022-07-13 NOTE — TELEPHONE ENCOUNTER
I called [facility] lab and spoke to izabela and both were neg do you want to send in medications he has sore throat with patches on tonsils

## 2022-07-13 NOTE — TELEPHONE ENCOUNTER
Caller: Omid Jarquin    Relationship: Self    Best call back number: [phone]    What is the best time to reach you: ANYTIME     Who are you requesting to speak with (clinical staff, provider,  specific staff member): NURSE         What was the call regarding: PATIENT REQUESTING A CALL BACK TO SEE IF HE NEEDS TO BE SEEN OR IF PROVIDER WANTS TO ORDER A COVID TEST   PATIENT HAS SORE THROAT AND ROUND WHITE PATCHES IN THROAT     Do you require a callback:YES

## 2022-08-03 RX ORDER — OMEPRAZOLE 40 MG/1
CAPSULE, DELAYED RELEASE ORAL
Qty: 90 CAPSULE | Refills: 3 | Status: SHIPPED | OUTPATIENT
Start: 2022-08-03

## 2022-08-24 ENCOUNTER — TELEPHONE (OUTPATIENT)
Dept: FAMILY MEDICINE CLINIC | Facility: CLINIC | Age: 47
End: 2022-08-24

## 2022-08-24 RX ORDER — TRAZODONE HYDROCHLORIDE 50 MG/1
50 TABLET ORAL NIGHTLY
Qty: 30 TABLET | Refills: 0 | Status: SHIPPED | OUTPATIENT
Start: 2022-08-24

## 2022-08-24 NOTE — TELEPHONE ENCOUNTER
----- Message from Tricia Johnson MA sent at 8/24/2022  8:14 AM CDT -----  Regarding: FW: cant stay alseep    ----- Message -----  From: Omid Jarquin  Sent: 8/24/2022   7:59 AM CDT  To: [facility]  Subject: cant stay alseep                                 Hey Doc,  I am experiencing something Su never experienced before.  For the past 3 weeks, I have not been able to stay asleep for more than 4 hrs .  No matter what time i go to bed, I wake within 3-4 hours.  I rarely go back to sleep after this.      Also, i am experienced a diminished appetite and weight loss (which im not concerned about unless its being caused by something bad).  additionally, my bowel movements have changed some.  In the past several weeks, my stool is not formed like normal, but is loose, not like diarrhea, but just loose with chunks.  I do not hurt anywhere.    I dont know if all these symptoms are related or not.  I have been under considerable stress recently, some from work, some (all self-inflicted) from my new relationship.  She actually calms me down and makes me feel much better.    I was wondering if there was something you could give me that would let me sleep longer (non habit forming, not feel like hangover next day, not ambien or anything like that).  I think more sleep would probably fix the other problems.  I have been taking melatonin for the past week.  It just makes me go to sleep fast, but doesn't help the duration.        Omid--lets try low dose trazadone 50mg q hs #30--keep me informed

## 2022-09-08 ENCOUNTER — TELEPHONE (OUTPATIENT)
Dept: FAMILY MEDICINE CLINIC | Facility: CLINIC | Age: 47
End: 2022-09-08

## 2022-09-08 DIAGNOSIS — F51.01 PRIMARY INSOMNIA: Primary | ICD-10-CM

## 2022-09-08 RX ORDER — DARIDOREXANT 25 MG/1
25 TABLET, FILM COATED ORAL NIGHTLY
Qty: 7 TABLET | Refills: 0 | Status: SHIPPED | OUTPATIENT
Start: 2022-09-08

## 2022-09-08 NOTE — TELEPHONE ENCOUNTER
Pt called an stated that the new med will have to have PA and md2 does not even have this med so I told him we will await the pa and see if it is covered then we can send to diff pharmcy

## 2022-09-08 NOTE — TELEPHONE ENCOUNTER
----- Message from Paris Barrow MA sent at 9/8/2022  7:44 AM CDT -----  Regarding: FW: cant stay alseep    ----- Message -----  From: Omid Jarquin  Sent: 9/8/2022   7:28 AM CDT  To: Melva Patel [facility]  Subject: cant stay [facility]                                 The trazadone does not work.  i have taken it for 2 weeks now with no improvement.  after the second night, when i go up i could not walk straight or keep my balance.  after that i started taking half a dose.  that didnt work.  last night i took a benadryl against my better judgement.  that gets me wired up for an hour after i take it, doesnt help me sleep, and leaves me feeling hung over in the morning.    Omid there is a new medication for insomnia --you may have seen the ads---if agreeable --lets try 25mg of QUVIVIQ for 1 week--#7--if not helpful lets go up to 50mg--keep me posted

## 2022-09-09 ENCOUNTER — TELEPHONE (OUTPATIENT)
Dept: FAMILY MEDICINE CLINIC | Facility: CLINIC | Age: 47
End: 2022-09-09

## 2022-09-09 ENCOUNTER — OFFICE VISIT (OUTPATIENT)
Dept: FAMILY MEDICINE CLINIC | Facility: CLINIC | Age: 47
End: 2022-09-09

## 2022-09-09 VITALS
OXYGEN SATURATION: 98 % | HEIGHT: 76 IN | BODY MASS INDEX: 30.81 KG/M2 | HEART RATE: 94 BPM | SYSTOLIC BLOOD PRESSURE: 136 MMHG | WEIGHT: 253 LBS | DIASTOLIC BLOOD PRESSURE: 76 MMHG

## 2022-09-09 DIAGNOSIS — F41.9 ANXIETY: Primary | ICD-10-CM

## 2022-09-09 DIAGNOSIS — G47.00 INSOMNIA, UNSPECIFIED TYPE: ICD-10-CM

## 2022-09-09 PROCEDURE — 99213 OFFICE O/P EST LOW 20 MIN: CPT | Performed by: FAMILY MEDICINE

## 2022-09-09 RX ORDER — LORAZEPAM 1 MG/1
1 TABLET ORAL NIGHTLY PRN
Qty: 30 TABLET | Refills: 0 | Status: SHIPPED | OUTPATIENT
Start: 2022-09-09

## 2022-09-09 NOTE — TELEPHONE ENCOUNTER
----- Message from Paris Barrow MA sent at 9/9/2022  7:58 AM CDT -----  Regarding: FW: higinio stay alseep    ----- Message -----  From: Omid Jarquin  Sent: 9/9/2022   7:39 AM CDT  To: Melva Patel [facility]  Subject: cant stay alsee                                 I called [facility] and they do not have the QUVUVIQ either.  They said with it being new it would have to be ordered.  [facility] said Not approved by insurance yet, and cost $470/mo without insurance.  Also, comes in 30 count bottle and [facility] will not break the bottle.    I didnt even sleep one hour last night.  I feel terrible.  I am to the point I am getting a little jittery and probably shouldnt be at work today.  I dont know what is causing this, but I need a solution.    Can he see me today?

## 2022-09-09 NOTE — TELEPHONE ENCOUNTER
----- Message from Paris Barrow MA sent at 9/9/2022  9:39 AM CDT -----  Regarding: FW: cant stay alseep    ----- Message -----  From: Omid Jarquin  Sent: 9/9/2022   9:33 AM CDT  To: Melva Patel [facility]  Subject: cant stay alseep                                 No. I have to work til 6pm.  Can he just do Telehealth and call me??  That would be better.  I think if I talk to him I can explain whats going on without having to come in to the office    im good with a video visit

## 2022-09-09 NOTE — PROGRESS NOTES
Subjective   Omid Jarquin is a 47 y.o. male.     Chief Complaint   Patient presents with   • Insomnia       History of Present Illness     been under a lot of stress duet o job, his son and relationsips --not suicidal      Current Outpatient Medications:   •  cetirizine (zyrTEC) 10 MG tablet, Take 10 mg by mouth Daily., Disp: , Rfl:   •  omeprazole (priLOSEC) 40 MG capsule, TAKE 1 CAPSULE DAILY, Disp: 90 capsule, Rfl: 3  •  Daridorexant HCl (Quviviq) 25 MG tablet, Take 25 mg by mouth Every Night., Disp: 7 tablet, Rfl: 0  •  LORazepam (Ativan) 1 MG tablet, Take 1 tablet by mouth At Night As Needed for Anxiety or Sleep., Disp: 30 tablet, Rfl: 0  •  traZODone (DESYREL) 50 MG tablet, Take 1 tablet by mouth Every Night., Disp: 30 tablet, Rfl: 0  Allergies   Allergen Reactions   • Augmentin [Amoxicillin-Pot Clavulanate] Diarrhea     Has tolerated in past   • Avelox [Moxifloxacin] Itching   • Levaquin [Levofloxacin In D5w] Nausea Only       BMI is >= 30 and <35. (Class 1 Obesity). The following options were offered after discussion;: nutrition counseling/recommendations      Past Medical History:   Diagnosis Date   • Allergic rhinitis    • C. difficile colitis    • Chronic rhinitis    • Chronic sinusitis    • Deviated nasal septum    • Eustachian tube dysfunction    • GERD (gastroesophageal reflux disease)    • Herpes    • Hypertrophy of nasal turbinates    • LPRD (laryngopharyngeal reflux disease)    • ADRIAN (obstructive sleep apnea)    • Sensorineural hearing loss    • Shift work sleep disorder    • Snoring    • Tinnitus    • Venous insufficiency      Past Surgical History:   Procedure Laterality Date   • COLONOSCOPY  08/23/2007    Normal exam   • COLONOSCOPY  09/05/2002    [facility] chronic proctitis rectum   • COLONOSCOPY N/A 6/12/2018    Procedure: COLONOSCOPY WITH ANESTHESIA;  Surgeon: Fritz Chambers MD;  Location: [facility] ENDOSCOPY;  Service: Gastroenterology   • ENDOSCOPY AND COLONOSCOPY  2001    Normal endo / Multiple  "aphthous ulcers in the rectum mild active proctitis   • HERNIA REPAIR      umbilical   • SEPTOPLASTY     • SINUS SURGERY  2000    [facility]er   • TONSILLECTOMY  30yrs    clay       Review of Systems   Constitutional: Negative.    HENT: Negative.    Eyes: Negative.    Respiratory: Negative.    Cardiovascular: Negative.    Gastrointestinal: Negative.    Endocrine: Negative.    Genitourinary: Negative.    Musculoskeletal: Negative.    Skin: Negative.    Allergic/Immunologic: Negative.    Neurological: Negative.    Hematological: Negative.    Psychiatric/Behavioral: Positive for sleep disturbance. Negative for self-injury and suicidal ideas. The patient is nervous/anxious.        Objective  /76   Pulse 94   Ht 193 cm (76\")   Wt 115 kg (253 lb)   SpO2 98%   BMI 30.80 kg/m²   Physical Exam  Vitals and nursing note reviewed.   Constitutional:       Appearance: Normal appearance. He is normal weight.   HENT:      Head: Normocephalic and atraumatic.      Nose: Nose normal.      Mouth/Throat:      Mouth: Mucous membranes are moist.   Eyes:      Pupils: Pupils are equal, round, and reactive to light.   Cardiovascular:      Rate and Rhythm: Normal rate and regular rhythm.      Pulses: Normal pulses.      Heart sounds: Normal heart sounds.   Pulmonary:      Effort: Pulmonary effort is normal.   Abdominal:      General: Abdomen is flat. Bowel sounds are normal.      Palpations: Abdomen is soft.   Musculoskeletal:         General: Normal range of motion.      Cervical back: Normal range of motion and neck supple.   Skin:     General: Skin is warm and dry.      Capillary Refill: Capillary refill takes less than 2 seconds.   Neurological:      General: No focal deficit present.      Mental Status: He is alert.   Psychiatric:         Mood and Affect: Mood normal.         Assessment & Plan   Diagnoses and all orders for this visit:    1. Anxiety (Primary)  -     LORazepam (Ativan) 1 MG tablet; Take 1 tablet by mouth At " Night As Needed for Anxiety or Sleep.  Dispense: 30 tablet; Refill: 0    2. Insomnia, unspecified type  -     LORazepam (Ativan) 1 MG tablet; Take 1 tablet by mouth At Night As Needed for Anxiety or Sleep.  Dispense: 30 tablet; Refill: 0          Keep me informed       No orders of the defined types were placed in this encounter.      Follow up: 4 month(s)

## 2022-10-05 DIAGNOSIS — F51.02 ADJUSTMENT INSOMNIA: Primary | ICD-10-CM

## 2022-10-05 RX ORDER — LORAZEPAM 1 MG/1
TABLET ORAL
Qty: 45 TABLET | Refills: 0 | Status: SHIPPED | OUTPATIENT
Start: 2022-10-05

## 2022-10-05 NOTE — TELEPHONE ENCOUNTER
----- Message from Paris Barrow MA sent at 10/5/2022  6:34 AM CDT -----  Regarding: FW: Ativan    ----- Message -----  From: Omid Jarquin  Sent: 10/4/2022  10:09 PM CDT  To: Melva Patel [facility]  Subject: Ativan                                           Hey doc,  the ativan is helping me sleep.  The one night I didnt take it, I was only able to sleep 4 hrs.  Most nights I only get 6 -6.5 hrs taking the 1mg dose.  So, two nights ago I took 1.5 and that let me sleep longer and feel more satisfied.  Will you give me a refill for this?  Is a 1.5mg dose available?        Yes we can do that

## 2022-11-11 DIAGNOSIS — F51.01 PRIMARY INSOMNIA: Primary | ICD-10-CM

## 2022-11-11 RX ORDER — ALPRAZOLAM 0.5 MG/1
0.5 TABLET ORAL NIGHTLY PRN
Qty: 30 TABLET | Refills: 0 | Status: SHIPPED | OUTPATIENT
Start: 2022-11-11 | End: 2023-04-03

## 2022-12-06 DIAGNOSIS — F51.01 PRIMARY INSOMNIA: Primary | ICD-10-CM

## 2022-12-06 RX ORDER — ALPRAZOLAM 1 MG/1
1 TABLET ORAL NIGHTLY PRN
Qty: 30 TABLET | Refills: 0 | Status: SHIPPED | OUTPATIENT
Start: 2022-12-06 | End: 2023-01-05 | Stop reason: SDUPTHER

## 2023-01-05 DIAGNOSIS — F51.01 PRIMARY INSOMNIA: ICD-10-CM

## 2023-01-05 RX ORDER — ALPRAZOLAM 1 MG/1
1 TABLET ORAL NIGHTLY PRN
Qty: 30 TABLET | Refills: 0 | Status: SHIPPED | OUTPATIENT
Start: 2023-01-05 | End: 2023-01-31 | Stop reason: SDUPTHER

## 2023-01-31 ENCOUNTER — PATIENT MESSAGE (OUTPATIENT)
Dept: FAMILY MEDICINE CLINIC | Facility: CLINIC | Age: 48
End: 2023-01-31
Payer: COMMERCIAL

## 2023-01-31 DIAGNOSIS — F51.01 PRIMARY INSOMNIA: ICD-10-CM

## 2023-01-31 RX ORDER — ALPRAZOLAM 1 MG/1
1 TABLET ORAL NIGHTLY PRN
Qty: 30 TABLET | Refills: 0 | Status: SHIPPED | OUTPATIENT
Start: 2023-01-31 | End: 2023-04-03

## 2023-01-31 RX ORDER — CLARITHROMYCIN 500 MG/1
500 TABLET, COATED ORAL 2 TIMES DAILY
Qty: 28 TABLET | Refills: 0 | Status: SHIPPED | OUTPATIENT
Start: 2023-01-31 | End: 2023-02-14

## 2023-02-23 DIAGNOSIS — F51.04 CHRONIC INSOMNIA: Primary | ICD-10-CM

## 2023-02-23 RX ORDER — ESZOPICLONE 3 MG/1
3 TABLET, FILM COATED ORAL NIGHTLY
Qty: 30 TABLET | Refills: 0 | Status: SHIPPED | OUTPATIENT
Start: 2023-02-23

## 2023-03-02 DIAGNOSIS — F51.01 PRIMARY INSOMNIA: Primary | ICD-10-CM

## 2023-03-02 RX ORDER — ALPRAZOLAM 2 MG/1
2 TABLET ORAL NIGHTLY PRN
Qty: 30 TABLET | Refills: 0 | Status: SHIPPED | OUTPATIENT
Start: 2023-03-02 | End: 2023-04-03

## 2023-04-03 DIAGNOSIS — F51.01 PRIMARY INSOMNIA: ICD-10-CM

## 2023-04-03 DIAGNOSIS — G47.00 INSOMNIA, UNSPECIFIED TYPE: Primary | ICD-10-CM

## 2023-04-03 RX ORDER — ALPRAZOLAM 2 MG/1
2 TABLET ORAL NIGHTLY PRN
Qty: 30 TABLET | Refills: 0 | Status: CANCELLED | OUTPATIENT
Start: 2023-04-03

## 2023-04-03 RX ORDER — LORAZEPAM 2 MG/1
2 TABLET ORAL EVERY 6 HOURS PRN
Qty: 7 TABLET | Refills: 0 | Status: SHIPPED | OUTPATIENT
Start: 2023-04-03 | End: 2023-04-14

## 2023-04-03 RX ORDER — ALPRAZOLAM 2 MG/1
2 TABLET ORAL NIGHTLY PRN
Qty: 90 TABLET | Refills: 0 | Status: SHIPPED | OUTPATIENT
Start: 2023-04-03 | End: 2023-04-10 | Stop reason: SDUPTHER

## 2023-04-10 ENCOUNTER — TELEPHONE (OUTPATIENT)
Dept: FAMILY MEDICINE CLINIC | Facility: CLINIC | Age: 48
End: 2023-04-10
Payer: COMMERCIAL

## 2023-04-10 DIAGNOSIS — F51.01 PRIMARY INSOMNIA: ICD-10-CM

## 2023-04-10 RX ORDER — ALPRAZOLAM 2 MG/1
2 TABLET ORAL NIGHTLY PRN
Qty: 7 TABLET | Refills: 0 | Status: SHIPPED | OUTPATIENT
Start: 2023-04-10 | End: 2023-04-14 | Stop reason: SDUPTHER

## 2023-04-10 RX ORDER — ALPRAZOLAM 2 MG/1
2 TABLET ORAL NIGHTLY PRN
Qty: 90 TABLET | Refills: 0 | Status: SHIPPED | OUTPATIENT
Start: 2023-04-10 | End: 2023-04-10 | Stop reason: SDUPTHER

## 2023-04-14 ENCOUNTER — OFFICE VISIT (OUTPATIENT)
Dept: FAMILY MEDICINE CLINIC | Facility: CLINIC | Age: 48
End: 2023-04-14
Payer: COMMERCIAL

## 2023-04-14 VITALS
SYSTOLIC BLOOD PRESSURE: 122 MMHG | OXYGEN SATURATION: 99 % | HEART RATE: 76 BPM | DIASTOLIC BLOOD PRESSURE: 82 MMHG | RESPIRATION RATE: 18 BRPM | WEIGHT: 259.6 LBS | BODY MASS INDEX: 31.61 KG/M2 | HEIGHT: 76 IN

## 2023-04-14 DIAGNOSIS — F51.01 PRIMARY INSOMNIA: ICD-10-CM

## 2023-04-14 DIAGNOSIS — I87.2 VENOUS INSUFFICIENCY: Primary | ICD-10-CM

## 2023-04-14 PROCEDURE — 99213 OFFICE O/P EST LOW 20 MIN: CPT | Performed by: FAMILY MEDICINE

## 2023-04-14 RX ORDER — ALPRAZOLAM 2 MG/1
2 TABLET ORAL NIGHTLY PRN
Qty: 7 TABLET | Refills: 0 | Status: SHIPPED | OUTPATIENT
Start: 2023-04-14

## 2023-04-14 NOTE — PROGRESS NOTES
Subjective   Omid Jarquin is a 47 y.o. male.     Chief Complaint   Patient presents with   • Leg Swelling     Patient is having issues with his legs. Looked bruised but they are not.       History of Present Illness     Omid went out of town and didnt wear his support hose--the veins appeared discolored witout pain or swelling and it was worrisone to hime      Current Outpatient Medications:   •  ALPRAZolam (Xanax) 2 MG tablet, Take 1 tablet by mouth At Night As Needed for Anxiety., Disp: 7 tablet, Rfl: 0  •  cetirizine (zyrTEC) 10 MG tablet, Take 1 tablet by mouth Daily., Disp: , Rfl:   •  omeprazole (priLOSEC) 40 MG capsule, TAKE 1 CAPSULE DAILY, Disp: 90 capsule, Rfl: 3  Allergies   Allergen Reactions   • Augmentin [Amoxicillin-Pot Clavulanate] Diarrhea     Has tolerated in past   • Avelox [Moxifloxacin] Itching   • Levaquin [Levofloxacin In D5w] Nausea Only       BMI is >= 30 and <35. (Class 1 Obesity). The following options were offered after discussion;: nutrition counseling/recommendations      Past Medical History:   Diagnosis Date   • Allergic rhinitis    • C. difficile colitis    • Chronic rhinitis    • Chronic sinusitis    • Deviated nasal septum    • Eustachian tube dysfunction    • GERD (gastroesophageal reflux disease)    • Herpes    • Hypertrophy of nasal turbinates    • LPRD (laryngopharyngeal reflux disease)    • ADRIAN (obstructive sleep apnea)    • Sensorineural hearing loss    • Shift work sleep disorder    • Snoring    • Tinnitus    • Venous insufficiency      Past Surgical History:   Procedure Laterality Date   • COLONOSCOPY  08/23/2007    Normal exam   • COLONOSCOPY  09/05/2002    [facility] chronic proctitis rectum   • COLONOSCOPY N/A 6/12/2018    Procedure: COLONOSCOPY WITH ANESTHESIA;  Surgeon: Fritz Chambers MD;  Location: [facility] ENDOSCOPY;  Service: Gastroenterology   • ENDOSCOPY AND COLONOSCOPY  2001    Normal endo / Multiple aphthous ulcers in the rectum mild active proctitis   • HERNIA  "REPAIR      umbilical   • SEPTOPLASTY     • SINUS SURGERY  2000    [facility]   • TONSILLECTOMY  30yrs    clay       Review of Systems   Constitutional: Negative.    HENT: Negative.    Eyes: Negative.    Respiratory: Negative.    Cardiovascular: Negative.    Gastrointestinal: Negative.    Endocrine: Negative.    Genitourinary: Negative.    Musculoskeletal: Negative.    Skin: Positive for color change.   Allergic/Immunologic: Negative.    Neurological: Negative.    Hematological: Negative.    Psychiatric/Behavioral: Positive for sleep disturbance.       Objective  /82 (BP Location: Left arm, Patient Position: Sitting, Cuff Size: Adult)   Pulse 76   Resp 18   Ht 193 cm (76\")   Wt 118 kg (259 lb 9.6 oz)   SpO2 99%   BMI 31.60 kg/m²   Physical Exam  Vitals and nursing note reviewed.   Constitutional:       Appearance: Normal appearance.   HENT:      Head: Normocephalic and atraumatic.      Mouth/Throat:      Mouth: Mucous membranes are moist.   Eyes:      Extraocular Movements: Extraocular movements intact.      Pupils: Pupils are equal, round, and reactive to light.   Cardiovascular:      Rate and Rhythm: Normal rate and regular rhythm.      Pulses: Normal pulses.      Heart sounds: Normal heart sounds.   Pulmonary:      Effort: Pulmonary effort is normal.      Breath sounds: Normal breath sounds.   Abdominal:      General: Abdomen is flat. Bowel sounds are normal.      Palpations: Abdomen is soft.   Musculoskeletal:         General: Normal range of motion.      Cervical back: Normal range of motion.   Skin:     General: Skin is warm and dry.      Capillary Refill: Capillary refill takes less than 2 seconds.      Findings: Bruising present.   Neurological:      General: No focal deficit present.      Mental Status: He is alert and oriented to person, place, and time. Mental status is at baseline.   Psychiatric:         Mood and Affect: Mood normal.         Assessment & Plan   Diagnoses and all orders for " this visit:    1. Venous insufficiency (Primary)  -     CBC & Differential  -     Comprehensive metabolic panel  -     US Venous Doppler Lower Extremity Bilat Duplex for Insufficiency                 Orders Placed This Encounter   Procedures   • US Venous Doppler Lower Extremity Bilat Duplex for Insufficiency     Order Specific Question:   Reason for Exam:     Answer:   leg swelling and discoloration     Order Specific Question:   Release to patient     Answer:   Routine Release   • Comprehensive metabolic panel     Order Specific Question:   Release to patient     Answer:   Routine Release   • CBC & Differential       Follow up: 2 month(s)

## 2023-04-15 LAB
ALBUMIN SERPL-MCNC: 4.8 G/DL (ref 3.5–5.2)
ALBUMIN/GLOB SERPL: 2.4 G/DL
ALP SERPL-CCNC: 70 U/L (ref 39–117)
ALT SERPL-CCNC: 28 U/L (ref 1–41)
AST SERPL-CCNC: 25 U/L (ref 1–40)
BASOPHILS # BLD AUTO: 0.04 10*3/MM3 (ref 0–0.2)
BASOPHILS NFR BLD AUTO: 0.7 % (ref 0–1.5)
BILIRUB SERPL-MCNC: 0.4 MG/DL (ref 0–1.2)
BUN SERPL-MCNC: 10 MG/DL (ref 6–20)
BUN/CREAT SERPL: 9.1 (ref 7–25)
CALCIUM SERPL-MCNC: 9.8 MG/DL (ref 8.6–10.5)
CHLORIDE SERPL-SCNC: 106 MMOL/L (ref 98–107)
CO2 SERPL-SCNC: 26.9 MMOL/L (ref 22–29)
CREAT SERPL-MCNC: 1.1 MG/DL (ref 0.76–1.27)
EGFRCR SERPLBLD CKD-EPI 2021: 82.3 ML/MIN/1.73
EOSINOPHIL # BLD AUTO: 0.11 10*3/MM3 (ref 0–0.4)
EOSINOPHIL NFR BLD AUTO: 2 % (ref 0.3–6.2)
ERYTHROCYTE [DISTWIDTH] IN BLOOD BY AUTOMATED COUNT: 14.1 % (ref 12.3–15.4)
GLOBULIN SER CALC-MCNC: 2 GM/DL
GLUCOSE SERPL-MCNC: 88 MG/DL (ref 65–99)
HCT VFR BLD AUTO: 41.9 % (ref 37.5–51)
HGB BLD-MCNC: 14 G/DL (ref 13–17.7)
IMM GRANULOCYTES # BLD AUTO: 0.01 10*3/MM3 (ref 0–0.05)
IMM GRANULOCYTES NFR BLD AUTO: 0.2 % (ref 0–0.5)
LYMPHOCYTES # BLD AUTO: 1.53 10*3/MM3 (ref 0.7–3.1)
LYMPHOCYTES NFR BLD AUTO: 27.9 % (ref 19.6–45.3)
MCH RBC QN AUTO: 28.8 PG (ref 26.6–33)
MCHC RBC AUTO-ENTMCNC: 33.4 G/DL (ref 31.5–35.7)
MCV RBC AUTO: 86.2 FL (ref 79–97)
MONOCYTES # BLD AUTO: 0.4 10*3/MM3 (ref 0.1–0.9)
MONOCYTES NFR BLD AUTO: 7.3 % (ref 5–12)
NEUTROPHILS # BLD AUTO: 3.39 10*3/MM3 (ref 1.7–7)
NEUTROPHILS NFR BLD AUTO: 61.9 % (ref 42.7–76)
NRBC BLD AUTO-RTO: 0 /100 WBC (ref 0–0.2)
PLATELET # BLD AUTO: 211 10*3/MM3 (ref 140–450)
POTASSIUM SERPL-SCNC: 4.3 MMOL/L (ref 3.5–5.2)
PROT SERPL-MCNC: 6.8 G/DL (ref 6–8.5)
RBC # BLD AUTO: 4.86 10*6/MM3 (ref 4.14–5.8)
SODIUM SERPL-SCNC: 144 MMOL/L (ref 136–145)
WBC # BLD AUTO: 5.48 10*3/MM3 (ref 3.4–10.8)

## 2023-05-10 RX ORDER — OFLOXACIN 3 MG/ML
5 SOLUTION AURICULAR (OTIC) 2 TIMES DAILY
Qty: 3 ML | Refills: 0 | Status: SHIPPED | OUTPATIENT
Start: 2023-05-10 | End: 2023-05-15

## 2023-05-19 ENCOUNTER — OFFICE VISIT (OUTPATIENT)
Dept: FAMILY MEDICINE CLINIC | Facility: CLINIC | Age: 48
End: 2023-05-19
Payer: COMMERCIAL

## 2023-05-19 VITALS
BODY MASS INDEX: 32.27 KG/M2 | HEART RATE: 72 BPM | TEMPERATURE: 97.3 F | SYSTOLIC BLOOD PRESSURE: 132 MMHG | OXYGEN SATURATION: 99 % | HEIGHT: 76 IN | DIASTOLIC BLOOD PRESSURE: 78 MMHG | WEIGHT: 265 LBS

## 2023-05-19 DIAGNOSIS — H91.92 HEARING LOSS OF LEFT EAR, UNSPECIFIED HEARING LOSS TYPE: ICD-10-CM

## 2023-05-19 DIAGNOSIS — H65.02 ACUTE SEROUS OTITIS MEDIA OF LEFT EAR, RECURRENCE NOT SPECIFIED: Primary | ICD-10-CM

## 2023-05-19 PROCEDURE — 99213 OFFICE O/P EST LOW 20 MIN: CPT | Performed by: FAMILY MEDICINE

## 2023-05-19 RX ORDER — METHYLPREDNISOLONE 4 MG/1
TABLET ORAL
Qty: 21 TABLET | Refills: 0 | Status: SHIPPED | OUTPATIENT
Start: 2023-05-19

## 2023-05-19 NOTE — PROGRESS NOTES
Subjective   Omid Jarquin is a 47 y.o. male.     Chief Complaint   Patient presents with    Ear Fullness        History of Present Illness     He notes diminsihed hearing in the left ear with fullnes sensation in the left ear      Current Outpatient Medications:     ALPRAZolam (Xanax) 2 MG tablet, Take 1 tablet by mouth At Night As Needed for Anxiety., Disp: 7 tablet, Rfl: 0    cetirizine (zyrTEC) 10 MG tablet, Take 1 tablet by mouth Daily., Disp: , Rfl:     omeprazole (priLOSEC) 40 MG capsule, TAKE 1 CAPSULE DAILY, Disp: 90 capsule, Rfl: 3    methylPREDNISolone (MEDROL) 4 MG dose pack, Take as directed on package instructions., Disp: 21 tablet, Rfl: 0  Allergies   Allergen Reactions    Augmentin [Amoxicillin-Pot Clavulanate] Diarrhea     Has tolerated in past    Avelox [Moxifloxacin] Itching    Levaquin [Levofloxacin In D5w] Nausea Only       BMI is >= 30 and <35. (Class 1 Obesity). The following options were offered after discussion;: nutrition counseling/recommendations      Past Medical History:   Diagnosis Date    Allergic rhinitis     C. difficile colitis     Chronic rhinitis     Chronic sinusitis     Deviated nasal septum     Eustachian tube dysfunction     GERD (gastroesophageal reflux disease)     Herpes     Hypertrophy of nasal turbinates     LPRD (laryngopharyngeal reflux disease)     ADRIAN (obstructive sleep apnea)     Sensorineural hearing loss     Shift work sleep disorder     Snoring     Tinnitus     Venous insufficiency      Past Surgical History:   Procedure Laterality Date    COLONOSCOPY  08/23/2007    Normal exam    COLONOSCOPY  09/05/2002    [facility] chronic proctitis rectum    COLONOSCOPY N/A 6/12/2018    Procedure: COLONOSCOPY WITH ANESTHESIA;  Surgeon: Fritz Chambers MD;  Location: [facility] ENDOSCOPY;  Service: Gastroenterology    ENDOSCOPY AND COLONOSCOPY  2001    Normal endo / Multiple aphthous ulcers in the rectum mild active proctitis    HERNIA REPAIR      umbilical    SEPTOPLASTY      SINUS  "SURGERY  2000    [facility]    TONSILLECTOMY  30yrs    clay       Review of Systems   Constitutional: Negative.    HENT:  Positive for congestion, ear pain and hearing loss.    Eyes: Negative.    Respiratory: Negative.     Cardiovascular: Negative.    Gastrointestinal: Negative.      Objective /78   Pulse 72   Temp 97.3 °F (36.3 °C)   Ht 193 cm (76\")   Wt 120 kg (265 lb)   SpO2 99%   BMI 32.26 kg/m²    Physical Exam  Vitals and nursing note reviewed.   Constitutional:       Appearance: Normal appearance.   HENT:      Head: Normocephalic and atraumatic.      Right Ear: There is no impacted cerumen.      Left Ear: There is no impacted cerumen.      Ears:      Comments: Left tm is slightly retracted with air fluid level     Nose: Nose normal.      Mouth/Throat:      Mouth: Mucous membranes are moist.   Eyes:      Pupils: Pupils are equal, round, and reactive to light.   Cardiovascular:      Rate and Rhythm: Normal rate.   Pulmonary:      Effort: Pulmonary effort is normal.   Abdominal:      General: Abdomen is flat.   Musculoskeletal:      Cervical back: Normal range of motion.   Neurological:      Mental Status: He is alert.       Assessment & Plan   Diagnoses and all orders for this visit:    1. Acute serous otitis media of left ear, recurrence not specified (Primary)  -     Ambulatory Referral to ENT (Otolaryngology)    2. Hearing loss of left ear, unspecified hearing loss type  -     Ambulatory Referral to ENT (Otolaryngology)    Other orders  -     methylPREDNISolone (MEDROL) 4 MG dose pack; Take as directed on package instructions.  Dispense: 21 tablet; Refill: 0                 Orders Placed This Encounter   Procedures    Ambulatory Referral to ENT (Otolaryngology)     Referral Priority:   Routine     Referral Type:   Consultation     Referral Reason:   Specialty Services Required     Referred to Provider:   Chris Rich MD     Requested Specialty:   Otolaryngology     Number of Visits " Requested:   1       Follow up: 4 month(s)

## 2023-07-31 RX ORDER — OMEPRAZOLE 40 MG/1
CAPSULE, DELAYED RELEASE ORAL
Qty: 90 CAPSULE | Refills: 3 | Status: SHIPPED | OUTPATIENT
Start: 2023-07-31

## 2023-10-04 ENCOUNTER — PATIENT MESSAGE (OUTPATIENT)
Dept: FAMILY MEDICINE CLINIC | Facility: CLINIC | Age: 48
End: 2023-10-04
Payer: COMMERCIAL

## 2023-10-04 DIAGNOSIS — F51.01 PRIMARY INSOMNIA: ICD-10-CM

## 2023-10-04 RX ORDER — ALPRAZOLAM 2 MG/1
2 TABLET ORAL NIGHTLY PRN
Qty: 90 TABLET | Refills: 0 | Status: SHIPPED | OUTPATIENT
Start: 2023-10-04

## 2023-11-15 ENCOUNTER — PATIENT MESSAGE (OUTPATIENT)
Dept: FAMILY MEDICINE CLINIC | Facility: CLINIC | Age: 48
End: 2023-11-15
Payer: COMMERCIAL

## 2023-11-15 RX ORDER — OMEPRAZOLE 40 MG/1
40 CAPSULE, DELAYED RELEASE ORAL DAILY
Qty: 90 CAPSULE | Refills: 3 | Status: SHIPPED | OUTPATIENT
Start: 2023-11-15

## 2023-12-23 RX ORDER — CEPHALEXIN 500 MG/1
500 CAPSULE ORAL 2 TIMES DAILY
Qty: 20 CAPSULE | Refills: 0 | Status: SHIPPED | OUTPATIENT
Start: 2023-12-23

## 2024-01-15 DIAGNOSIS — F51.01 PRIMARY INSOMNIA: ICD-10-CM

## 2024-01-15 RX ORDER — ALPRAZOLAM 2 MG/1
2 TABLET ORAL NIGHTLY PRN
Qty: 90 TABLET | Refills: 0 | Status: SHIPPED | OUTPATIENT
Start: 2024-01-15

## 2024-02-16 ENCOUNTER — OFFICE VISIT (OUTPATIENT)
Dept: FAMILY MEDICINE CLINIC | Facility: CLINIC | Age: 49
End: 2024-02-16
Payer: COMMERCIAL

## 2024-02-16 VITALS
OXYGEN SATURATION: 99 % | DIASTOLIC BLOOD PRESSURE: 92 MMHG | HEART RATE: 78 BPM | RESPIRATION RATE: 18 BRPM | HEIGHT: 76 IN | TEMPERATURE: 97.3 F | BODY MASS INDEX: 33.85 KG/M2 | WEIGHT: 278 LBS | SYSTOLIC BLOOD PRESSURE: 140 MMHG

## 2024-02-16 DIAGNOSIS — I10 PRIMARY HYPERTENSION: ICD-10-CM

## 2024-02-16 DIAGNOSIS — Z00.00 ANNUAL PHYSICAL EXAM: Primary | ICD-10-CM

## 2024-02-16 DIAGNOSIS — Z12.5 SCREENING FOR MALIGNANT NEOPLASM OF PROSTATE: ICD-10-CM

## 2024-02-16 RX ORDER — LISINOPRIL AND HYDROCHLOROTHIAZIDE 12.5; 1 MG/1; MG/1
1 TABLET ORAL DAILY
Qty: 30 TABLET | Refills: 5 | Status: SHIPPED | OUTPATIENT
Start: 2024-02-16

## 2024-02-17 LAB
ALBUMIN SERPL-MCNC: 4.4 G/DL (ref 4.1–5.1)
ALBUMIN/GLOB SERPL: 2.1 {RATIO} (ref 1.2–2.2)
ALP SERPL-CCNC: 69 IU/L (ref 44–121)
ALT SERPL-CCNC: 30 IU/L (ref 0–44)
APPEARANCE UR: CLEAR
AST SERPL-CCNC: 19 IU/L (ref 0–40)
BACTERIA #/AREA URNS HPF: NORMAL /[HPF]
BASOPHILS # BLD AUTO: 0.1 X10E3/UL (ref 0–0.2)
BASOPHILS NFR BLD AUTO: 1 %
BILIRUB SERPL-MCNC: 0.6 MG/DL (ref 0–1.2)
BILIRUB UR QL STRIP: NEGATIVE
BUN SERPL-MCNC: 11 MG/DL (ref 6–24)
BUN/CREAT SERPL: 11 (ref 9–20)
CALCIUM SERPL-MCNC: 9 MG/DL (ref 8.7–10.2)
CASTS URNS QL MICRO: NORMAL /LPF
CHLORIDE SERPL-SCNC: 102 MMOL/L (ref 96–106)
CHOLEST SERPL-MCNC: 183 MG/DL (ref 100–199)
CHOLEST/HDLC SERPL: 4.3 RATIO (ref 0–5)
CO2 SERPL-SCNC: 25 MMOL/L (ref 20–29)
COLOR UR: YELLOW
CREAT SERPL-MCNC: 1.01 MG/DL (ref 0.76–1.27)
EGFRCR SERPLBLD CKD-EPI 2021: 91 ML/MIN/1.73
EOSINOPHIL # BLD AUTO: 0.1 X10E3/UL (ref 0–0.4)
EOSINOPHIL NFR BLD AUTO: 2 %
EPI CELLS #/AREA URNS HPF: NORMAL /HPF (ref 0–10)
ERYTHROCYTE [DISTWIDTH] IN BLOOD BY AUTOMATED COUNT: 14.1 % (ref 11.6–15.4)
GLOBULIN SER CALC-MCNC: 2.1 G/DL (ref 1.5–4.5)
GLUCOSE SERPL-MCNC: 99 MG/DL (ref 70–99)
GLUCOSE UR QL STRIP: NEGATIVE
HCT VFR BLD AUTO: 42.5 % (ref 37.5–51)
HDLC SERPL-MCNC: 43 MG/DL
HGB BLD-MCNC: 14.1 G/DL (ref 13–17.7)
HGB UR QL STRIP: NEGATIVE
IMM GRANULOCYTES # BLD AUTO: 0 X10E3/UL (ref 0–0.1)
IMM GRANULOCYTES NFR BLD AUTO: 0 %
KETONES UR QL STRIP: NEGATIVE
LDLC SERPL CALC-MCNC: 112 MG/DL (ref 0–99)
LEUKOCYTE ESTERASE UR QL STRIP: NEGATIVE
LYMPHOCYTES # BLD AUTO: 1.2 X10E3/UL (ref 0.7–3.1)
LYMPHOCYTES NFR BLD AUTO: 26 %
MCH RBC QN AUTO: 28.3 PG (ref 26.6–33)
MCHC RBC AUTO-ENTMCNC: 33.2 G/DL (ref 31.5–35.7)
MCV RBC AUTO: 85 FL (ref 79–97)
MICRO URNS: NORMAL
MICRO URNS: NORMAL
MONOCYTES # BLD AUTO: 0.3 X10E3/UL (ref 0.1–0.9)
MONOCYTES NFR BLD AUTO: 7 %
NEUTROPHILS # BLD AUTO: 3.1 X10E3/UL (ref 1.4–7)
NEUTROPHILS NFR BLD AUTO: 64 %
NITRITE UR QL STRIP: NEGATIVE
PH UR STRIP: 7 [PH] (ref 5–7.5)
PLATELET # BLD AUTO: 218 X10E3/UL (ref 150–450)
POTASSIUM SERPL-SCNC: 4.1 MMOL/L (ref 3.5–5.2)
PROT SERPL-MCNC: 6.5 G/DL (ref 6–8.5)
PROT UR QL STRIP: NEGATIVE
PSA SERPL-MCNC: 0.9 NG/ML (ref 0–4)
RBC # BLD AUTO: 4.99 X10E6/UL (ref 4.14–5.8)
RBC #/AREA URNS HPF: NORMAL /HPF (ref 0–2)
SODIUM SERPL-SCNC: 138 MMOL/L (ref 134–144)
SP GR UR STRIP: 1.01 (ref 1–1.03)
TRIGL SERPL-MCNC: 161 MG/DL (ref 0–149)
UROBILINOGEN UR STRIP-MCNC: 0.2 MG/DL (ref 0.2–1)
VLDLC SERPL CALC-MCNC: 28 MG/DL (ref 5–40)
WBC # BLD AUTO: 4.8 X10E3/UL (ref 3.4–10.8)
WBC #/AREA URNS HPF: NORMAL /HPF (ref 0–5)

## 2024-04-12 ENCOUNTER — OFFICE VISIT (OUTPATIENT)
Dept: GASTROENTEROLOGY | Facility: CLINIC | Age: 49
End: 2024-04-12
Payer: COMMERCIAL

## 2024-04-12 VITALS
HEART RATE: 68 BPM | SYSTOLIC BLOOD PRESSURE: 138 MMHG | OXYGEN SATURATION: 100 % | BODY MASS INDEX: 33.61 KG/M2 | WEIGHT: 276 LBS | TEMPERATURE: 97.5 F | DIASTOLIC BLOOD PRESSURE: 88 MMHG | HEIGHT: 76 IN

## 2024-04-12 DIAGNOSIS — I10 HTN (HYPERTENSION), BENIGN: ICD-10-CM

## 2024-04-12 DIAGNOSIS — K44.9 HIATAL HERNIA: Primary | ICD-10-CM

## 2024-04-12 DIAGNOSIS — Z78.9 NONSMOKER: ICD-10-CM

## 2024-04-12 DIAGNOSIS — R10.13 DYSPEPSIA: ICD-10-CM

## 2024-04-12 NOTE — H&P (VIEW-ONLY)
Omid Jarquin  1975 4/12/2024  Chief Complaint   Patient presents with    Hiatal Hernia     Subjective   HPI  Omid Jarquin is a 48 y.o. male who presents with a complaint of hiatal hernia found on chest xray on 3/31/21. He has had ongoing acid reflux he has had for years he manages with medications. He is on Prilosec. He denies any nausea or vomiting. No dysphagia. No early satiety. He has gas and air he feels after he eats that you can hear across the room as well. He feels a flutter in the epigastric region that will last seconds and goes away.   Chest xray from 3/31/21  IMPRESSION:  1. Elevation of the right hemidiaphragm and large hiatal hernia, with  what appears to be atelectasis in the right lung base. Difficult to  exclude pneumonia in the right base.        This report was finalized on 03/31/2021 20:25 by Dr Víctor Kilgore, .   He is up to date with his colonoscopy.   Past Medical History:   Diagnosis Date    Allergic rhinitis     C. difficile colitis     Chronic rhinitis     Chronic sinusitis     Deviated nasal septum     Eustachian tube dysfunction     GERD (gastroesophageal reflux disease)     Herpes     Hypertrophy of nasal turbinates     LPRD (laryngopharyngeal reflux disease)     ADRIAN (obstructive sleep apnea)     Sensorineural hearing loss     Shift work sleep disorder     Snoring     Tinnitus     Venous insufficiency      Past Surgical History:   Procedure Laterality Date    ADENOIDECTOMY  30 yrs    COLONOSCOPY  08/23/2007    Normal exam    COLONOSCOPY  09/05/2002    [facility] chronic proctitis rectum    COLONOSCOPY N/A 06/12/2018    normal exam repeat 10 years    ENDOSCOPY AND COLONOSCOPY  2001    Normal endo / Multiple aphthous ulcers in the rectum mild active proctitis    HERNIA REPAIR      umbilical    SEPTOPLASTY      SINUS SURGERY  2000    [facility]    TONSILLECTOMY  30yrs    clay       Outpatient Medications Marked as Taking for the 4/12/24 encounter (Office Visit) with Tracy Henson  ZACARIAS Marsh   Medication Sig Dispense Refill    ALPRAZolam (XANAX) 2 MG tablet Take 1 tablet by mouth At Night As Needed for Anxiety. 90 tablet 0    cetirizine (zyrTEC) 10 MG tablet Take 1 tablet by mouth Daily.      fluticasone (FLONASE) 50 MCG/ACT nasal spray 2 sprays into the nostril(s) as directed by provider Daily. (Patient taking differently: 2 sprays into the nostril(s) as directed by provider As Needed.) 16 g 11    omeprazole (priLOSEC) 40 MG capsule Take 1 capsule by mouth Daily. 90 capsule 3    pseudoephedrine (SUDAFED) 30 MG tablet Take 1 tablet by mouth Every 4 (Four) Hours As Needed for Congestion.       Allergies   Allergen Reactions    Augmentin [Amoxicillin-Pot Clavulanate] Diarrhea     Has tolerated in past    Avelox [Moxifloxacin] Itching    Levaquin [Levofloxacin In D5w] Nausea Only     Social History     Socioeconomic History    Marital status:    Tobacco Use    Smoking status: Never    Smokeless tobacco: Never   Vaping Use    Vaping status: Never Used   Substance and Sexual Activity    Alcohol use: Yes     Comment: occasional    Drug use: No    Sexual activity: Defer     Family History   Problem Relation Age of Onset    No Known Problems Mother     Cancer Father         skin    Colon cancer Father     Colon polyps Father      Health Maintenance   Topic Date Due    TDAP/TD VACCINES (1 - Tdap) Never done    ANNUAL PHYSICAL  12/23/2021    ZOSTER VACCINE (1 of 2) Never done    COVID-19 Vaccine (1 - 2023-24 season) Never done    BMI FOLLOWUP  05/19/2024    INFLUENZA VACCINE  08/01/2024    COLORECTAL CANCER SCREENING  06/12/2028    HEPATITIS C SCREENING  Completed    Pneumococcal Vaccine 0-64  Aged Out     Review of Systems   Constitutional:  Negative for activity change, appetite change, chills, diaphoresis, fatigue, fever and unexpected weight change.   HENT:  Negative for ear pain, hearing loss, mouth sores, sore throat, trouble swallowing and voice change.    Eyes: Negative.    Respiratory:   "Negative for cough, choking, shortness of breath and wheezing.    Cardiovascular:  Negative for chest pain and palpitations.   Gastrointestinal:  Negative for abdominal pain, blood in stool, constipation, diarrhea, nausea and vomiting.        GERD/reflux   Endocrine: Negative for cold intolerance and heat intolerance.   Genitourinary:  Negative for decreased urine volume, dysuria, frequency, hematuria and urgency.   Musculoskeletal:  Negative for back pain, gait problem and myalgias.   Skin:  Negative for color change, pallor and rash.   Allergic/Immunologic: Negative for food allergies and immunocompromised state.   Neurological:  Negative for dizziness, tremors, seizures, syncope, weakness, light-headedness, numbness and headaches.   Hematological:  Negative for adenopathy. Does not bruise/bleed easily.   Psychiatric/Behavioral:  Negative for agitation and confusion. The patient is not nervous/anxious.    All other systems reviewed and are negative.    Objective   Vitals:    04/12/24 0905   BP: 138/88   Pulse: 68   Temp: 97.5 °F (36.4 °C)   SpO2: 100%   Weight: 125 kg (276 lb)   Height: 193 cm (76\")     Body mass index is 33.6 kg/m².  Physical Exam  Constitutional:       Appearance: He is well-developed.   HENT:      Head: Normocephalic and atraumatic.   Eyes:      Pupils: Pupils are equal, round, and reactive to light.   Neck:      Trachea: No tracheal deviation.   Cardiovascular:      Rate and Rhythm: Normal rate and regular rhythm.      Heart sounds: Normal heart sounds. No murmur heard.     No friction rub. No gallop.   Pulmonary:      Effort: Pulmonary effort is normal. No respiratory distress.      Breath sounds: Normal breath sounds. No wheezing or rales.   Chest:      Chest wall: No tenderness.   Abdominal:      General: Bowel sounds are normal. There is no distension.      Palpations: Abdomen is soft. Abdomen is not rigid.      Tenderness: There is no abdominal tenderness. There is no guarding or rebound. "   Musculoskeletal:         General: No tenderness or deformity. Normal range of motion.      Cervical back: Normal range of motion and neck supple.   Skin:     General: Skin is warm and dry.      Coloration: Skin is not pale.      Findings: No rash.   Neurological:      Mental Status: He is alert and oriented to person, place, and time.      Deep Tendon Reflexes: Reflexes are normal and symmetric.   Psychiatric:         Behavior: Behavior normal.         Thought Content: Thought content normal.         Judgment: Judgment normal.       Assessment & Plan   Diagnoses and all orders for this visit:    1. Hiatal hernia (Primary)  -     FL Upper GI With Air Contrast; Future  -     Case Request; Standing  -     Case Request    2. Dyspepsia    3. Nonsmoker    4. HTN (hypertension), benign  Comments:  he is off medication for this at this time. he is following    Other orders  -     Implement Anesthesia Orders Day of Procedure; Standing  -     Follow Anesthesia Guidelines / Protocol; Future  -     Obtain Informed Consent; Future  -     Obtain Informed Consent; Standing      ESOPHAGOGASTRODUODENOSCOPY WITH ANESTHESIA (N/A)  Part of this note may be an electronic transcription/translation of spoken language to printed text using the Dragon Dictation System.  Body mass index is 33.6 kg/m².  No follow-ups on file.    I discussed non pharmaceutical treatment of gerd.  This includes gradually losing weight to achieve ideal body wt., elevation of the head of bed by 4-6 inches, nothing to eat or drink 3 hours prior to lying down, avoiding tight clothing, stress reduction, tobacco cessation, reduction of alcohol intake, and dietary restrictions (avoiding caffeine, coffee, fatty foods, mints, chocolate, spicy foods and tomato based sauces as much as possible).  Continue PPI therapy      All risks, benefits, alternatives, and indications of colonoscopy and/or Endoscopy procedure have been discussed with the patient. Risks to include  perforation of the colon requiring possible surgery or colostomy, risk of bleeding from biopsies or removal of colon tissue, possibility of missing a colon polyp or cancer, or adverse drug reaction.  Benefits to include the diagnosis and management of disease of the colon and rectum. Alternatives to include barium enema, radiographic evaluation, lab testing or no intervention. Pt verbalizes understanding and agrees.     Tracy Marsh Sixto, APRN  4/12/2024  09:32 CDT          If you smoke or use tobacco, 4 minutes reading provided  Steps to Quit Smoking  Smoking tobacco can be harmful to your health and can affect almost every organ in your body. Smoking puts you, and those around you, at risk for developing many serious chronic diseases. Quitting smoking is difficult, but it is one of the best things that you can do for your health. It is never too late to quit.  What are the benefits of quitting smoking?  When you quit smoking, you lower your risk of developing serious diseases and conditions, such as:  Lung cancer or lung disease, such as COPD.  Heart disease.  Stroke.  Heart attack.  Infertility.  Osteoporosis and bone fractures.  Additionally, symptoms such as coughing, wheezing, and shortness of breath may get better when you quit. You may also find that you get sick less often because your body is stronger at fighting off colds and infections. If you are pregnant, quitting smoking can help to reduce your chances of having a baby of low birth weight.  How do I get ready to quit?  When you decide to quit smoking, create a plan to make sure that you are successful. Before you quit:  Pick a date to quit. Set a date within the next two weeks to give you time to prepare.  Write down the reasons why you are quitting. Keep this list in places where you will see it often, such as on your bathroom mirror or in your car or wallet.  Identify the people, places, things, and activities that make you want to smoke  (triggers) and avoid them. Make sure to take these actions:  Throw away all cigarettes at home, at work, and in your car.  Throw away smoking accessories, such as ashtrays and lighters.  Clean your car and make sure to empty the ashtray.  Clean your home, including curtains and carpets.  Tell your family, friends, and coworkers that you are quitting. Support from your loved ones can make quitting easier.  Talk with your health care provider about your options for quitting smoking.  Find out what treatment options are covered by your health insurance.  What strategies can I use to quit smoking?  Talk with your healthcare provider about different strategies to quit smoking. Some strategies include:  Quitting smoking altogether instead of gradually lessening how much you smoke over a period of time. Research shows that quitting “cold turkey” is more successful than gradually quitting.  Attending in-person counseling to help you build problem-solving skills. You are more likely to have success in quitting if you attend several counseling sessions. Even short sessions of 10 minutes can be effective.  Finding resources and support systems that can help you to quit smoking and remain smoke-free after you quit. These resources are most helpful when you use them often. They can include:  Online chats with a counselor.  Telephone quitlines.  Printed self-help materials.  Support groups or group counseling.  Text messaging programs.  Mobile phone applications.  Taking medicines to help you quit smoking. (If you are pregnant or breastfeeding, talk with your health care provider first.) Some medicines contain nicotine and some do not. Both types of medicines help with cravings, but the medicines that include nicotine help to relieve withdrawal symptoms. Your health care provider may recommend:  Nicotine patches, gum, or lozenges.  Nicotine inhalers or sprays.  Non-nicotine medicine that is taken by mouth.  Talk with your health  care provider about combining strategies, such as taking medicines while you are also receiving in-person counseling. Using these two strategies together makes you more likely to succeed in quitting than if you used either strategy on its own.  If you are pregnant or breastfeeding, talk with your health care provider about finding counseling or other support strategies to quit smoking. Do not take medicine to help you quit smoking unless told to do so by your health care provider.  What things can I do to make it easier to quit?  Quitting smoking might feel overwhelming at first, but there is a lot that you can do to make it easier. Take these important actions:  Reach out to your family and friends and ask that they support and encourage you during this time. Call telephone quitlines, reach out to support groups, or work with a counselor for support.  Ask people who smoke to avoid smoking around you.  Avoid places that trigger you to smoke, such as bars, parties, or smoke-break areas at work.  Spend time around people who do not smoke.  Lessen stress in your life, because stress can be a smoking trigger for some people. To lessen stress, try:  Exercising regularly.  Deep-breathing exercises.  Yoga.  Meditating.  Performing a body scan. This involves closing your eyes, scanning your body from head to toe, and noticing which parts of your body are particularly tense. Purposefully relax the muscles in those areas.  Download or purchase mobile phone or tablet apps (applications) that can help you stick to your quit plan by providing reminders, tips, and encouragement. There are many free apps, such as QuitGuide from the CDC (Centers for Disease Control and Prevention). You can find other support for quitting smoking (smoking cessation) through smokefree.gov and other websites.  How will I feel when I quit smoking?  Within the first 24 hours of quitting smoking, you may start to feel some withdrawal symptoms. These  symptoms are usually most noticeable 2-3 days after quitting, but they usually do not last beyond 2-3 weeks. Changes or symptoms that you might experience include:  Mood swings.  Restlessness, anxiety, or irritation.  Difficulty concentrating.  Dizziness.  Strong cravings for sugary foods in addition to nicotine.  Mild weight gain.  Constipation.  Nausea.  Coughing or a sore throat.  Changes in how your medicines work in your body.  A depressed mood.  Difficulty sleeping (insomnia).  After the first 2-3 weeks of quitting, you may start to notice more positive results, such as:  Improved sense of smell and taste.  Decreased coughing and sore throat.  Slower heart rate.  Lower blood pressure.  Clearer skin.  The ability to breathe more easily.  Fewer sick days.  Quitting smoking is very challenging for most people. Do not get discouraged if you are not successful the first time. Some people need to make many attempts to quit before they achieve long-term success. Do your best to stick to your quit plan, and talk with your health care provider if you have any questions or concerns.  This information is not intended to replace advice given to you by your health care provider. Make sure you discuss any questions you have with your health care provider.  Document Released: 12/12/2002 Document Revised: 08/15/2017 Document Reviewed: 05/03/2016  [employer] Interactive Patient Education © 2017 [employer]

## 2024-04-12 NOTE — PROGRESS NOTES
Omid Jarquin  1975 4/12/2024  Chief Complaint   Patient presents with    Hiatal Hernia     Subjective   HPI  Omid Jarquin is a 48 y.o. male who presents with a complaint of hiatal hernia found on chest xray on 3/31/21. He has had ongoing acid reflux he has had for years he manages with medications. He is on Prilosec. He denies any nausea or vomiting. No dysphagia. No early satiety. He has gas and air he feels after he eats that you can hear across the room as well. He feels a flutter in the epigastric region that will last seconds and goes away.   Chest xray from 3/31/21  IMPRESSION:  1. Elevation of the right hemidiaphragm and large hiatal hernia, with  what appears to be atelectasis in the right lung base. Difficult to  exclude pneumonia in the right base.        This report was finalized on 03/31/2021 20:25 by Dr Víctor Kilgore, .   He is up to date with his colonoscopy.   Past Medical History:   Diagnosis Date    Allergic rhinitis     C. difficile colitis     Chronic rhinitis     Chronic sinusitis     Deviated nasal septum     Eustachian tube dysfunction     GERD (gastroesophageal reflux disease)     Herpes     Hypertrophy of nasal turbinates     LPRD (laryngopharyngeal reflux disease)     ADRIAN (obstructive sleep apnea)     Sensorineural hearing loss     Shift work sleep disorder     Snoring     Tinnitus     Venous insufficiency      Past Surgical History:   Procedure Laterality Date    ADENOIDECTOMY  30 yrs    COLONOSCOPY  08/23/2007    Normal exam    COLONOSCOPY  09/05/2002    [facility] chronic proctitis rectum    COLONOSCOPY N/A 06/12/2018    normal exam repeat 10 years    ENDOSCOPY AND COLONOSCOPY  2001    Normal endo / Multiple aphthous ulcers in the rectum mild active proctitis    HERNIA REPAIR      umbilical    SEPTOPLASTY      SINUS SURGERY  2000    [facility]    TONSILLECTOMY  30yrs    [facility]       Outpatient Medications Marked as Taking for the 4/12/24 encounter (Office Visit) with Tracy Henson  ZACARIAS Marsh   Medication Sig Dispense Refill    ALPRAZolam (XANAX) 2 MG tablet Take 1 tablet by mouth At Night As Needed for Anxiety. 90 tablet 0    cetirizine (zyrTEC) 10 MG tablet Take 1 tablet by mouth Daily.      fluticasone (FLONASE) 50 MCG/ACT nasal spray 2 sprays into the nostril(s) as directed by provider Daily. (Patient taking differently: 2 sprays into the nostril(s) as directed by provider As Needed.) 16 g 11    omeprazole (priLOSEC) 40 MG capsule Take 1 capsule by mouth Daily. 90 capsule 3    pseudoephedrine (SUDAFED) 30 MG tablet Take 1 tablet by mouth Every 4 (Four) Hours As Needed for Congestion.       Allergies   Allergen Reactions    Augmentin [Amoxicillin-Pot Clavulanate] Diarrhea     Has tolerated in past    Avelox [Moxifloxacin] Itching    Levaquin [Levofloxacin In D5w] Nausea Only     Social History     Socioeconomic History    Marital status:    Tobacco Use    Smoking status: Never    Smokeless tobacco: Never   Vaping Use    Vaping status: Never Used   Substance and Sexual Activity    Alcohol use: Yes     Comment: occasional    Drug use: No    Sexual activity: Defer     Family History   Problem Relation Age of Onset    No Known Problems Mother     Cancer Father         skin    Colon cancer Father     Colon polyps Father      Health Maintenance   Topic Date Due    TDAP/TD VACCINES (1 - Tdap) Never done    ANNUAL PHYSICAL  12/23/2021    ZOSTER VACCINE (1 of 2) Never done    COVID-19 Vaccine (1 - 2023-24 season) Never done    BMI FOLLOWUP  05/19/2024    INFLUENZA VACCINE  08/01/2024    COLORECTAL CANCER SCREENING  06/12/2028    HEPATITIS C SCREENING  Completed    Pneumococcal Vaccine 0-64  Aged Out     Review of Systems   Constitutional:  Negative for activity change, appetite change, chills, diaphoresis, fatigue, fever and unexpected weight change.   HENT:  Negative for ear pain, hearing loss, mouth sores, sore throat, trouble swallowing and voice change.    Eyes: Negative.    Respiratory:   "Negative for cough, choking, shortness of breath and wheezing.    Cardiovascular:  Negative for chest pain and palpitations.   Gastrointestinal:  Negative for abdominal pain, blood in stool, constipation, diarrhea, nausea and vomiting.        GERD/reflux   Endocrine: Negative for cold intolerance and heat intolerance.   Genitourinary:  Negative for decreased urine volume, dysuria, frequency, hematuria and urgency.   Musculoskeletal:  Negative for back pain, gait problem and myalgias.   Skin:  Negative for color change, pallor and rash.   Allergic/Immunologic: Negative for food allergies and immunocompromised state.   Neurological:  Negative for dizziness, tremors, seizures, syncope, weakness, light-headedness, numbness and headaches.   Hematological:  Negative for adenopathy. Does not bruise/bleed easily.   Psychiatric/Behavioral:  Negative for agitation and confusion. The patient is not nervous/anxious.    All other systems reviewed and are negative.    Objective   Vitals:    04/12/24 0905   BP: 138/88   Pulse: 68   Temp: 97.5 °F (36.4 °C)   SpO2: 100%   Weight: 125 kg (276 lb)   Height: 193 cm (76\")     Body mass index is 33.6 kg/m².  Physical Exam  Constitutional:       Appearance: He is well-developed.   HENT:      Head: Normocephalic and atraumatic.   Eyes:      Pupils: Pupils are equal, round, and reactive to light.   Neck:      Trachea: No tracheal deviation.   Cardiovascular:      Rate and Rhythm: Normal rate and regular rhythm.      Heart sounds: Normal heart sounds. No murmur heard.     No friction rub. No gallop.   Pulmonary:      Effort: Pulmonary effort is normal. No respiratory distress.      Breath sounds: Normal breath sounds. No wheezing or rales.   Chest:      Chest wall: No tenderness.   Abdominal:      General: Bowel sounds are normal. There is no distension.      Palpations: Abdomen is soft. Abdomen is not rigid.      Tenderness: There is no abdominal tenderness. There is no guarding or rebound. "   Musculoskeletal:         General: No tenderness or deformity. Normal range of motion.      Cervical back: Normal range of motion and neck supple.   Skin:     General: Skin is warm and dry.      Coloration: Skin is not pale.      Findings: No rash.   Neurological:      Mental Status: He is alert and oriented to person, place, and time.      Deep Tendon Reflexes: Reflexes are normal and symmetric.   Psychiatric:         Behavior: Behavior normal.         Thought Content: Thought content normal.         Judgment: Judgment normal.       Assessment & Plan   Diagnoses and all orders for this visit:    1. Hiatal hernia (Primary)  -     FL Upper GI With Air Contrast; Future  -     Case Request; Standing  -     Case Request    2. Dyspepsia    3. Nonsmoker    4. HTN (hypertension), benign  Comments:  he is off medication for this at this time. he is following    Other orders  -     Implement Anesthesia Orders Day of Procedure; Standing  -     Follow Anesthesia Guidelines / Protocol; Future  -     Obtain Informed Consent; Future  -     Obtain Informed Consent; Standing      ESOPHAGOGASTRODUODENOSCOPY WITH ANESTHESIA (N/A)  Part of this note may be an electronic transcription/translation of spoken language to printed text using the Dragon Dictation System.  Body mass index is 33.6 kg/m².  No follow-ups on file.    I discussed non pharmaceutical treatment of gerd.  This includes gradually losing weight to achieve ideal body wt., elevation of the head of bed by 4-6 inches, nothing to eat or drink 3 hours prior to lying down, avoiding tight clothing, stress reduction, tobacco cessation, reduction of alcohol intake, and dietary restrictions (avoiding caffeine, coffee, fatty foods, mints, chocolate, spicy foods and tomato based sauces as much as possible).  Continue PPI therapy      All risks, benefits, alternatives, and indications of colonoscopy and/or Endoscopy procedure have been discussed with the patient. Risks to include  perforation of the colon requiring possible surgery or colostomy, risk of bleeding from biopsies or removal of colon tissue, possibility of missing a colon polyp or cancer, or adverse drug reaction.  Benefits to include the diagnosis and management of disease of the colon and rectum. Alternatives to include barium enema, radiographic evaluation, lab testing or no intervention. Pt verbalizes understanding and agrees.     Tracy Marsh Sixto, APRN  4/12/2024  09:32 CDT          If you smoke or use tobacco, 4 minutes reading provided  Steps to Quit Smoking  Smoking tobacco can be harmful to your health and can affect almost every organ in your body. Smoking puts you, and those around you, at risk for developing many serious chronic diseases. Quitting smoking is difficult, but it is one of the best things that you can do for your health. It is never too late to quit.  What are the benefits of quitting smoking?  When you quit smoking, you lower your risk of developing serious diseases and conditions, such as:  Lung cancer or lung disease, such as COPD.  Heart disease.  Stroke.  Heart attack.  Infertility.  Osteoporosis and bone fractures.  Additionally, symptoms such as coughing, wheezing, and shortness of breath may get better when you quit. You may also find that you get sick less often because your body is stronger at fighting off colds and infections. If you are pregnant, quitting smoking can help to reduce your chances of having a baby of low birth weight.  How do I get ready to quit?  When you decide to quit smoking, create a plan to make sure that you are successful. Before you quit:  Pick a date to quit. Set a date within the next two weeks to give you time to prepare.  Write down the reasons why you are quitting. Keep this list in places where you will see it often, such as on your bathroom mirror or in your car or wallet.  Identify the people, places, things, and activities that make you want to smoke  (triggers) and avoid them. Make sure to take these actions:  Throw away all cigarettes at home, at work, and in your car.  Throw away smoking accessories, such as ashtrays and lighters.  Clean your car and make sure to empty the ashtray.  Clean your home, including curtains and carpets.  Tell your family, friends, and coworkers that you are quitting. Support from your loved ones can make quitting easier.  Talk with your health care provider about your options for quitting smoking.  Find out what treatment options are covered by your health insurance.  What strategies can I use to quit smoking?  Talk with your healthcare provider about different strategies to quit smoking. Some strategies include:  Quitting smoking altogether instead of gradually lessening how much you smoke over a period of time. Research shows that quitting “cold turkey” is more successful than gradually quitting.  Attending in-person counseling to help you build problem-solving skills. You are more likely to have success in quitting if you attend several counseling sessions. Even short sessions of 10 minutes can be effective.  Finding resources and support systems that can help you to quit smoking and remain smoke-free after you quit. These resources are most helpful when you use them often. They can include:  Online chats with a counselor.  Telephone quitlines.  Printed self-help materials.  Support groups or group counseling.  Text messaging programs.  Mobile phone applications.  Taking medicines to help you quit smoking. (If you are pregnant or breastfeeding, talk with your health care provider first.) Some medicines contain nicotine and some do not. Both types of medicines help with cravings, but the medicines that include nicotine help to relieve withdrawal symptoms. Your health care provider may recommend:  Nicotine patches, gum, or lozenges.  Nicotine inhalers or sprays.  Non-nicotine medicine that is taken by mouth.  Talk with your health  care provider about combining strategies, such as taking medicines while you are also receiving in-person counseling. Using these two strategies together makes you more likely to succeed in quitting than if you used either strategy on its own.  If you are pregnant or breastfeeding, talk with your health care provider about finding counseling or other support strategies to quit smoking. Do not take medicine to help you quit smoking unless told to do so by your health care provider.  What things can I do to make it easier to quit?  Quitting smoking might feel overwhelming at first, but there is a lot that you can do to make it easier. Take these important actions:  Reach out to your family and friends and ask that they support and encourage you during this time. Call telephone quitlines, reach out to support groups, or work with a counselor for support.  Ask people who smoke to avoid smoking around you.  Avoid places that trigger you to smoke, such as bars, parties, or smoke-break areas at work.  Spend time around people who do not smoke.  Lessen stress in your life, because stress can be a smoking trigger for some people. To lessen stress, try:  Exercising regularly.  Deep-breathing exercises.  Yoga.  Meditating.  Performing a body scan. This involves closing your eyes, scanning your body from head to toe, and noticing which parts of your body are particularly tense. Purposefully relax the muscles in those areas.  Download or purchase mobile phone or tablet apps (applications) that can help you stick to your quit plan by providing reminders, tips, and encouragement. There are many free apps, such as QuitGuide from the CDC (Centers for Disease Control and Prevention). You can find other support for quitting smoking (smoking cessation) through smokefree.gov and other websites.  How will I feel when I quit smoking?  Within the first 24 hours of quitting smoking, you may start to feel some withdrawal symptoms. These  symptoms are usually most noticeable 2-3 days after quitting, but they usually do not last beyond 2-3 weeks. Changes or symptoms that you might experience include:  Mood swings.  Restlessness, anxiety, or irritation.  Difficulty concentrating.  Dizziness.  Strong cravings for sugary foods in addition to nicotine.  Mild weight gain.  Constipation.  Nausea.  Coughing or a sore throat.  Changes in how your medicines work in your body.  A depressed mood.  Difficulty sleeping (insomnia).  After the first 2-3 weeks of quitting, you may start to notice more positive results, such as:  Improved sense of smell and taste.  Decreased coughing and sore throat.  Slower heart rate.  Lower blood pressure.  Clearer skin.  The ability to breathe more easily.  Fewer sick days.  Quitting smoking is very challenging for most people. Do not get discouraged if you are not successful the first time. Some people need to make many attempts to quit before they achieve long-term success. Do your best to stick to your quit plan, and talk with your health care provider if you have any questions or concerns.  This information is not intended to replace advice given to you by your health care provider. Make sure you discuss any questions you have with your health care provider.  Document Released: 12/12/2002 Document Revised: 08/15/2017 Document Reviewed: 05/03/2016  [employer] Interactive Patient Education © 2017 [employer]

## 2024-04-15 PROBLEM — K44.9 HIATAL HERNIA: Status: ACTIVE | Noted: 2024-04-12

## 2024-04-16 DIAGNOSIS — F51.01 PRIMARY INSOMNIA: ICD-10-CM

## 2024-04-16 RX ORDER — ALPRAZOLAM 2 MG/1
2 TABLET ORAL NIGHTLY PRN
Qty: 90 TABLET | Refills: 0 | Status: SHIPPED | OUTPATIENT
Start: 2024-04-16

## 2024-04-17 ENCOUNTER — HOSPITAL ENCOUNTER (OUTPATIENT)
Dept: GENERAL RADIOLOGY | Facility: HOSPITAL | Age: 49
Discharge: HOME | End: 2024-04-17
Admitting: CLINICAL NURSE SPECIALIST
Payer: COMMERCIAL

## 2024-04-17 DIAGNOSIS — K44.9 HIATAL HERNIA: ICD-10-CM

## 2024-04-17 PROCEDURE — 74246 X-RAY XM UPR GI TRC 2CNTRST: CPT

## 2024-04-17 RX ADMIN — BARIUM SULFATE 240 ML: 960 POWDER, FOR SUSPENSION ORAL at 09:50

## 2024-04-24 ENCOUNTER — ANESTHESIA (OUTPATIENT)
Dept: GASTROENTEROLOGY | Facility: HOSPITAL | Age: 49
End: 2024-04-24
Payer: COMMERCIAL

## 2024-04-24 ENCOUNTER — ANESTHESIA EVENT (OUTPATIENT)
Dept: GASTROENTEROLOGY | Facility: HOSPITAL | Age: 49
End: 2024-04-24
Payer: COMMERCIAL

## 2024-04-24 ENCOUNTER — HOSPITAL ENCOUNTER (OUTPATIENT)
Facility: HOSPITAL | Age: 49
Setting detail: HOSPITAL OUTPATIENT SURGERY
Discharge: HOME | End: 2024-04-24
Attending: INTERNAL MEDICINE | Admitting: INTERNAL MEDICINE
Payer: COMMERCIAL

## 2024-04-24 VITALS
TEMPERATURE: 96.9 F | DIASTOLIC BLOOD PRESSURE: 69 MMHG | SYSTOLIC BLOOD PRESSURE: 101 MMHG | WEIGHT: 275 LBS | HEART RATE: 63 BPM | BODY MASS INDEX: 35.29 KG/M2 | OXYGEN SATURATION: 95 % | RESPIRATION RATE: 20 BRPM | HEIGHT: 74 IN

## 2024-04-24 DIAGNOSIS — K44.9 HIATAL HERNIA: ICD-10-CM

## 2024-04-24 PROCEDURE — 25010000002 PROPOFOL 10 MG/ML EMULSION: Performed by: NURSE ANESTHETIST, CERTIFIED REGISTERED

## 2024-04-24 PROCEDURE — 25810000003 SODIUM CHLORIDE 0.9 % SOLUTION: Performed by: ANESTHESIOLOGY

## 2024-04-24 PROCEDURE — 88305 TISSUE EXAM BY PATHOLOGIST: CPT | Performed by: INTERNAL MEDICINE

## 2024-04-24 PROCEDURE — 43235 EGD DIAGNOSTIC BRUSH WASH: CPT | Performed by: INTERNAL MEDICINE

## 2024-04-24 RX ORDER — LIDOCAINE HYDROCHLORIDE 10 MG/ML
0.5 INJECTION, SOLUTION EPIDURAL; INFILTRATION; INTRACAUDAL; PERINEURAL ONCE AS NEEDED
Status: CANCELLED | OUTPATIENT
Start: 2024-04-24

## 2024-04-24 RX ORDER — SODIUM CHLORIDE 0.9 % (FLUSH) 0.9 %
10 SYRINGE (ML) INJECTION AS NEEDED
Status: DISCONTINUED | OUTPATIENT
Start: 2024-04-24 | End: 2024-04-24 | Stop reason: HOSPADM

## 2024-04-24 RX ORDER — SODIUM CHLORIDE 9 MG/ML
500 INJECTION, SOLUTION INTRAVENOUS CONTINUOUS PRN
Status: DISCONTINUED | OUTPATIENT
Start: 2024-04-24 | End: 2024-04-24 | Stop reason: HOSPADM

## 2024-04-24 RX ORDER — PROPOFOL 10 MG/ML
VIAL (ML) INTRAVENOUS AS NEEDED
Status: DISCONTINUED | OUTPATIENT
Start: 2024-04-24 | End: 2024-04-24 | Stop reason: SURG

## 2024-04-24 RX ORDER — LIDOCAINE HYDROCHLORIDE 20 MG/ML
INJECTION, SOLUTION EPIDURAL; INFILTRATION; INTRACAUDAL; PERINEURAL AS NEEDED
Status: DISCONTINUED | OUTPATIENT
Start: 2024-04-24 | End: 2024-04-24 | Stop reason: SURG

## 2024-04-24 RX ADMIN — LIDOCAINE HYDROCHLORIDE 200 MG: 20 INJECTION, SOLUTION EPIDURAL; INFILTRATION; INTRACAUDAL; PERINEURAL at 10:43

## 2024-04-24 RX ADMIN — GLYCOPYRROLATE 0.1 MG: 0.2 INJECTION INTRAMUSCULAR; INTRAVENOUS at 10:39

## 2024-04-24 RX ADMIN — PROPOFOL INJECTABLE EMULSION 200 MG: 10 INJECTION, EMULSION INTRAVENOUS at 10:43

## 2024-04-24 RX ADMIN — SODIUM CHLORIDE 500 ML: 9 INJECTION, SOLUTION INTRAVENOUS at 10:24

## 2024-04-24 NOTE — ANESTHESIA PREPROCEDURE EVALUATION
Anesthesia Evaluation     Patient summary reviewed   no history of anesthetic complications:   NPO Solid Status: > 8 hours             Airway   Mallampati: II  Dental      Pulmonary    (+) ,sleep apnea on CPAP  Cardiovascular   Exercise tolerance: excellent (>7 METS)    (+) hypertension      Neuro/Psych- negative ROS  GI/Hepatic/Renal/Endo    (+) obesity, GERD    Musculoskeletal     Abdominal    Substance History      OB/GYN          Other                    Anesthesia Plan    ASA 2     MAC       Anesthetic plan, risks, benefits, and alternatives have been provided, discussed and informed consent has been obtained with: patient.    CODE STATUS:

## 2024-04-24 NOTE — ANESTHESIA POSTPROCEDURE EVALUATION
Patient: Omid Jarquin    Procedure Summary       Date: 04/24/24 Room / Location: [facility] ENDOSCOPY 4 / [facility] ENDOSCOPY    Anesthesia Start: 1038 Anesthesia Stop: 1054    Procedure: ESOPHAGOGASTRODUODENOSCOPY WITH ANESTHESIA Diagnosis:       Hiatal hernia      (Hiatal hernia [K44.9])    Surgeons: Fritz Chambers MD Provider: Nadira Juarez CRNA    Anesthesia Type: MAC ASA Status: 2            Anesthesia Type: MAC    Vitals  Vitals Value Taken Time   BP 96/67 04/24/24 1106   Temp     Pulse 63 04/24/24 1108   Resp 29 04/24/24 1100   SpO2 94 % 04/24/24 1108   Vitals shown include unfiled device data.        Post Anesthesia Care and Evaluation    Patient location during evaluation: PHASE II  Patient participation: complete - patient participated  Level of consciousness: awake  Pain score: 0  Pain management: adequate    Airway patency: patent  Anesthetic complications: No anesthetic complications  PONV Status: none  Cardiovascular status: acceptable  Respiratory status: acceptable  Hydration status: acceptable

## 2024-04-25 LAB
LAB AP CASE REPORT: NORMAL
Lab: NORMAL
PATH REPORT.FINAL DX SPEC: NORMAL
PATH REPORT.GROSS SPEC: NORMAL

## 2024-04-29 ENCOUNTER — TELEPHONE (OUTPATIENT)
Dept: GASTROENTEROLOGY | Facility: CLINIC | Age: 49
End: 2024-04-29
Payer: COMMERCIAL

## 2024-04-29 DIAGNOSIS — K44.9 HIATAL HERNIA: Primary | ICD-10-CM

## 2024-07-21 DIAGNOSIS — F51.01 PRIMARY INSOMNIA: ICD-10-CM

## 2024-07-22 RX ORDER — ALPRAZOLAM 2 MG/1
2 TABLET ORAL NIGHTLY PRN
Qty: 90 TABLET | Refills: 0 | Status: SHIPPED | OUTPATIENT
Start: 2024-07-22

## 2024-07-22 NOTE — TELEPHONE ENCOUNTER
Rx Refill Note  Requested Prescriptions     Pending Prescriptions Disp Refills    ALPRAZolam (XANAX) 2 MG tablet 90 tablet 0     Sig: Take 1 tablet by mouth At Night As Needed for Anxiety.      Last office visit with office: 02/16/24  Next office visit with office: no future appointment on file     UDS: not on file     DATE OF LAST REFILL: 04/16/24    Controlled Substance Agreement: not up to date    [place]          {TIP  Is Refill Pharmacy correct?:  Paris Barrow MA  07/22/24, 07:47 CDT

## 2024-08-19 ENCOUNTER — TELEPHONE (OUTPATIENT)
Dept: FAMILY MEDICINE CLINIC | Facility: CLINIC | Age: 49
End: 2024-08-19

## 2024-08-21 ENCOUNTER — CLINICAL SUPPORT (OUTPATIENT)
Dept: FAMILY MEDICINE CLINIC | Facility: CLINIC | Age: 49
End: 2024-08-21
Payer: COMMERCIAL

## 2024-08-21 DIAGNOSIS — T14.90XA INJURY: Primary | ICD-10-CM

## 2024-08-21 PROCEDURE — 90471 IMMUNIZATION ADMIN: CPT | Performed by: NURSE PRACTITIONER

## 2024-08-21 PROCEDURE — 90715 TDAP VACCINE 7 YRS/> IM: CPT | Performed by: NURSE PRACTITIONER

## 2024-10-21 DIAGNOSIS — F51.01 PRIMARY INSOMNIA: ICD-10-CM

## 2024-10-21 RX ORDER — ALPRAZOLAM 2 MG
2 TABLET ORAL NIGHTLY PRN
Qty: 90 TABLET | Refills: 0 | OUTPATIENT
Start: 2024-10-21

## 2024-10-21 NOTE — TELEPHONE ENCOUNTER
Caller: Omid Jarquin    Relationship: Self    Best call back number: [phone]     Requested Prescriptions:   Requested Prescriptions     Pending Prescriptions Disp Refills    ALPRAZolam (XANAX) 2 MG tablet 90 tablet 0     Sig: Take 1 tablet by mouth At Night As Needed for Anxiety.        Pharmacy where request should be sent: [facility] - [place], [place] - [address] [facility] [phone] [facility] [phone] FX     Last office visit with prescribing clinician: Visit date not found   Last telemedicine visit with prescribing clinician: Visit date not found   Next office visit with prescribing clinician: Visit date not found     Additional details provided by patient: COMPLETELY OUT     Does the patient have less than a 3 day supply:  [x] Yes  [] No    Would you like a call back once the refill request has been completed: [x] Yes [] No      Kvng Castro Rep   10/21/24 16:03 CDT

## 2024-10-22 NOTE — TELEPHONE ENCOUNTER
Pt stated he wanted to be scheduled with Dr. Monterroso. Pt was offered sooner appts but could not accept those days due to work. Pt has appt 12/26

## 2024-10-23 RX ORDER — ALPRAZOLAM 2 MG
2 TABLET ORAL NIGHTLY PRN
Qty: 90 TABLET | Refills: 0 | Status: SHIPPED | OUTPATIENT
Start: 2024-10-23

## 2024-11-27 RX ORDER — OMEPRAZOLE 40 MG/1
40 CAPSULE, DELAYED RELEASE ORAL DAILY
Qty: 90 CAPSULE | Refills: 3 | Status: SHIPPED | OUTPATIENT
Start: 2024-11-27

## 2024-12-26 ENCOUNTER — OFFICE VISIT (OUTPATIENT)
Dept: FAMILY MEDICINE CLINIC | Facility: CLINIC | Age: 49
End: 2024-12-26
Payer: COMMERCIAL

## 2024-12-26 VITALS
HEIGHT: 74 IN | SYSTOLIC BLOOD PRESSURE: 130 MMHG | DIASTOLIC BLOOD PRESSURE: 72 MMHG | WEIGHT: 288 LBS | BODY MASS INDEX: 36.96 KG/M2 | HEART RATE: 85 BPM | OXYGEN SATURATION: 98 %

## 2024-12-26 DIAGNOSIS — K21.9 GASTROESOPHAGEAL REFLUX DISEASE, UNSPECIFIED WHETHER ESOPHAGITIS PRESENT: ICD-10-CM

## 2024-12-26 DIAGNOSIS — I10 PRIMARY HYPERTENSION: Primary | ICD-10-CM

## 2024-12-26 DIAGNOSIS — F51.01 PRIMARY INSOMNIA: ICD-10-CM

## 2024-12-26 DIAGNOSIS — M25.521 RIGHT ELBOW PAIN: ICD-10-CM

## 2024-12-26 PROBLEM — R10.11 RIGHT UPPER QUADRANT ABDOMINAL PAIN: Status: RESOLVED | Noted: 2017-06-27 | Resolved: 2024-12-26

## 2024-12-26 PROBLEM — H65.02 ACUTE SEROUS OTITIS MEDIA OF LEFT EAR: Status: RESOLVED | Noted: 2023-05-19 | Resolved: 2024-12-26

## 2024-12-26 PROBLEM — J01.90 ACUTE SINUSITIS: Status: RESOLVED | Noted: 2019-01-24 | Resolved: 2024-12-26

## 2024-12-26 PROCEDURE — 99214 OFFICE O/P EST MOD 30 MIN: CPT

## 2024-12-26 RX ORDER — DOXEPIN 6 MG/1
6 TABLET, FILM COATED ORAL NIGHTLY
Qty: 30 TABLET | Refills: 2 | Status: SHIPPED | OUTPATIENT
Start: 2024-12-26

## 2024-12-26 NOTE — PROGRESS NOTES
Chief Complaint  Hypertension, Heartburn, Med Refill, and Pain (Right elbow)    Subjective      Omid Jarquin presents to [facility]  History of Present Illness  The patient is a 49-year-old male who presents today for a follow-up visit.    He reports experiencing elbow pain, which has been managed by his chiropractor on several occasions. The diagnosis of tendinitis was made by the chiropractor, a condition he has not previously experienced in either elbow. The onset of this discomfort dates back to 09/2024, with the right elbow continuing to cause him distress. The pain is localized to the elbow and does not radiate to the fingertips. He has been using a Velcro wrap with pads, which provides some relief. However, activities such as loading firewood exacerbate the pain. He has not taken any medication for the pain or inflammation. His side occupation involves driving a truck, which requires a cranking motion that has not previously caused him discomfort. He also engages in farm work, including tarping and unloading.    He has a history of a large hiatal hernia, diagnosed approximately 5 years ago. Currently Asymptomatic. Was evaluated by Dr. Sawant who performed testing and referred patient to [facility], [place]. As was asymptomatic no surgery was warranted at the time. Over the past few years, he has noticed an increase in breathlessness during activities that he previously could perform without difficulty. He has been advised to lose weight by both his doctor and chiropractor. He successfully lost 40 pounds 2 years ago, which improved his symptoms, but he has since regained the weight.  He has been monitoring his blood pressure since 12/17/2024, with readings ranging from 150/89 in the morning to 142/86 at night, and as low as 108/67. He believes that weight loss could help lower his blood pressure. He has reduced his intake of soda and sweet tea. During his previous weight loss  "period, he achieved success by reducing his food portions and sweet drink consumption, and by walking for 30 minutes 3 times a week. He has been taking lisinopril as needed, but not daily, and has only taken it twice since 12/17/2024. He reports no side effects from the medication. He has been using a CPAP machine for 6 years and is interested in discontinuing its use. He typically works 12-hour shifts and experiences drowsiness by 10:00 AM reports that he pushed thorough it and is able to complete the rest of his day.    He expresses a desire to discontinue Xanax, which he has been taking for 2 years. He reports difficulty sleeping, often lying awake for 2 to 3 hours at night, leading to exhaustion. He has tried various over-the-counter remedies without success including melatonin. He was prescribed trazodone, but discontinued it due to sexual side effects. He occasionally experiences anxiety related to work and other stressors. He has not tried doxepin or any alpha antagonist medications. He was previously prescribed Sonata, but was unable to obtain it due to insurance issues. He is currently taking Xanax 2 mg at night and finds it difficult to function at work after only 4 to 5 hours of sleep.    SOCIAL HISTORY  He drinks soda and sweet tea.    MEDICATIONS  Current: lisinopril, hydrochlorothiazide, Xanax  Past: trazodone      Objective   Vital Signs:  /72   Pulse 85   Ht 188 cm (74\")   Wt 131 kg (288 lb)   SpO2 98%   BMI 36.98 kg/m²   Estimated body mass index is 36.98 kg/m² as calculated from the following:    Height as of this encounter: 188 cm (74\").    Weight as of this encounter: 131 kg (288 lb).        Physical Exam     Physical Exam  Vital Signs  Blood pressure is 130/72.      Result Review :  The following labs/imaging/notes were reviewed and discussed with the patient by Bari Monterroso MD on 12/26/2024:   Latest Reference Range & Units 02/16/24 08:45   Sodium 134 - 144 mmol/L 138   Potassium 3.5 " - 5.2 mmol/L 4.1   Chloride 96 - 106 mmol/L 102   CO2 20 - 29 mmol/L 25   BUN 6 - 24 mg/dL 11   Creatinine 0.76 - 1.27 mg/dL 1.01   BUN/Creatinine Ratio 9 - 20  11   EGFR Result >59 mL/min/1.73 91   Glucose 70 - 99 mg/dL 99   Calcium 8.7 - 10.2 mg/dL 9.0   Alkaline Phosphatase 44 - 121 IU/L 69   Total Protein 6.0 - 8.5 g/dL 6.5   Albumin 4.1 - 5.1 g/dL 4.4   A/G Ratio 1.2 - 2.2  2.1   AST (SGOT) 0 - 40 IU/L 19   ALT (SGPT) 0 - 44 IU/L 30   Total Bilirubin 0.0 - 1.2 mg/dL 0.6   Total Cholesterol 100 - 199 mg/dL 183   HDL Cholesterol >39 mg/dL 43   LDL Cholesterol  0 - 99 mg/dL 112 (H)   Triglycerides 0 - 149 mg/dL 161 (H)   Chol/HDL Ratio 0.0 - 5.0 ratio 4.3   VLDL Cholesterol Efraín 5 - 40 mg/dL 28   Globulin 1.5 - 4.5 g/dL 2.1   WBC 3.4 - 10.8 x10E3/uL 4.8   RBC 4.14 - 5.80 x10E6/uL 4.99   Hemoglobin 13.0 - 17.7 g/dL 14.1   Hematocrit 37.5 - 51.0 % 42.5   Platelets 150 - 450 x10E3/uL 218   RDW 11.6 - 15.4 % 14.1   MCV 79 - 97 fL 85   MCH 26.6 - 33.0 pg 28.3   MCHC 31.5 - 35.7 g/dL 33.2   Neutrophil Rel % Not Estab. % 64   Lymphocyte Rel % Not Estab. % 26   Monocyte Rel % Not Estab. % 7   Eosinophil Rel % Not Estab. % 2   Basophil Rel % Not Estab. % 1   Immature Granulocyte Rel % Not Estab. % 0   Neutrophils Absolute 1.4 - 7.0 x10E3/uL 3.1   Lymphocytes Absolute 0.7 - 3.1 x10E3/uL 1.2   Monocytes Absolute 0.1 - 0.9 x10E3/uL 0.3   Eosinophils Absolute 0.0 - 0.4 x10E3/uL 0.1   Basophils Absolute 0.0 - 0.2 x10E3/uL 0.1   Immature Grans, Absolute 0.0 - 0.1 x10E3/uL 0.0   PSA 0.0 - 4.0 ng/mL 0.9   Color, UA Yellow  Yellow   Appearance, UA Clear  Clear   Specific Gravity, UA 1.005 - 1.030  1.006   pH, UA 5.0 - 7.5  7.0   Glucose Negative  Negative   Ketones, UA Negative  Negative   Blood, UA Negative  Negative   Nitrite, UA Negative  Negative   Leukocytes, UA Negative  Negative   Protein, UA Negative/Trace  Negative   Bilirubin, UA Negative  Negative   RBC, UA 0 - 2 /hpf None seen   WBC, UA 0 - 5 /hpf None seen   Bacteria,  UA None seen/Few  None seen   Epithelial Cells (non renal) 0 - 10 /hpf None seen   Casts None seen /lpf None seen   Microscopic Examination  Comment  See below:   Urobilinogen, UA 0.2 - 1.0 mg/dL 0.2   (H): Data is abnormally high          Assessment      Diagnoses and all orders for this visit:    1. Primary hypertension (Primary)    2. Right elbow pain    3. Gastroesophageal reflux disease, unspecified whether esophagitis present    4. Primary insomnia    Other orders  -     Doxepin HCl 6 MG tablet; Take 1 tablet by mouth Every Night.  Dispense: 30 tablet; Refill: 2             Assessment & Plan  1. Right elbow tendinitis.  The condition is likely due to overuse, as evidenced by his occupation involving frequent cranking motions. He was advised to apply diclofenac gel twice daily, ensuring to wash his hands post-application. The use of a brace was recommended during activities such as tarping to provide stability and prevent swelling.    2. Hypertension.  His blood pressure readings have been inconsistent, with systolic values ranging from 108 to 150 and diastolic values from 67 to 90. This fluctuation may be attributed to his intermittent use of lisinopril/hydrochlorothiazide, which he only takes when his blood pressure exceeds 150/90. He has expressed a desire to lose weight, which could potentially eliminate the need for antihypertensive medication. He was advised to take lisinopril/hydrochlorothiazide daily in the morning. A weight loss of 10 to 20 percent could potentially reduce the need for one antihypertensive medication.    3. Insomnia.  He has been on Xanax 2 mg nightly for the past 2 years and wishes to discontinue its use. He has not tried doxepin or any alpha antagonist medications. He was advised to gradually reduce his Xanax dosage to 1 mg nightly over the next few weeks, then further halve the dose for an additional week before discontinuing it completely. Concurrently, he was advised to start  doxepin, taking one tablet 30 to 40 minutes before bedtime.    Follow-up  The patient will follow up in 2 months.    No orders of the defined types were placed in this encounter.      Follow Up   Return in about 2 months (around 2/26/2025) for HTN w/ Med changes, insominia w/ med changes, obesity w/ life style mods. .  Patient was given instructions and counseling regarding his condition or for health maintenance advice. Please see specific information pulled into the AVS if appropriate.         Patient or patient representative verbalized consent for the use of Ambient Listening during the visit with  Bari Monterroso MD for chart documentation. 12/26/2024  15:00 CST

## 2025-01-26 ENCOUNTER — PATIENT MESSAGE (OUTPATIENT)
Dept: FAMILY MEDICINE CLINIC | Facility: CLINIC | Age: 50
End: 2025-01-26
Payer: COMMERCIAL

## 2025-01-27 RX ORDER — DOXEPIN HYDROCHLORIDE 10 MG/1
10 CAPSULE ORAL NIGHTLY
Qty: 30 CAPSULE | Refills: 1 | Status: SHIPPED | OUTPATIENT
Start: 2025-01-27

## 2025-02-07 ENCOUNTER — TELEPHONE (OUTPATIENT)
Dept: FAMILY MEDICINE CLINIC | Facility: CLINIC | Age: 50
End: 2025-02-07

## 2025-02-07 NOTE — TELEPHONE ENCOUNTER
Caller: Omid Jarquin    Relationship to patient: Self    Best call back number: [phone]       Patient is needing: STILL WAITING ON DR FRANCIS TO ANSWER [employer] MESSAGE? CAN IT BE ANSWERED TODAY? LET PATIENT KNOW. THANK YOU!

## 2025-08-21 RX ORDER — OMEPRAZOLE 40 MG/1
40 CAPSULE, DELAYED RELEASE ORAL DAILY
Qty: 90 CAPSULE | Refills: 3 | OUTPATIENT
Start: 2025-08-21
